# Patient Record
Sex: FEMALE | Race: WHITE | Employment: PART TIME | URBAN - METROPOLITAN AREA
[De-identification: names, ages, dates, MRNs, and addresses within clinical notes are randomized per-mention and may not be internally consistent; named-entity substitution may affect disease eponyms.]

---

## 2017-10-11 ENCOUNTER — HOSPITAL ENCOUNTER (EMERGENCY)
Age: 22
Discharge: HOME | End: 2017-10-11
Attending: FAMILY MEDICINE

## 2017-10-11 VITALS
WEIGHT: 135 LBS | SYSTOLIC BLOOD PRESSURE: 135 MMHG | OXYGEN SATURATION: 99 % | HEIGHT: 67 IN | BODY MASS INDEX: 21.19 KG/M2 | HEART RATE: 61 BPM | RESPIRATION RATE: 18 BRPM | DIASTOLIC BLOOD PRESSURE: 69 MMHG | TEMPERATURE: 98.1 F

## 2017-10-11 DIAGNOSIS — B35.4 TINEA CORPORIS: Primary | ICD-10-CM

## 2017-10-11 RX ORDER — CLOTRIMAZOLE AND BETAMETHASONE DIPROPIONATE 10; .64 MG/G; MG/G
CREAM TOPICAL
Qty: 30 G | Refills: 0 | Status: ON HOLD | OUTPATIENT
Start: 2017-10-11 | End: 2021-04-28

## 2017-10-11 NOTE — UC PROVIDER NOTE
Patient is a 20 y.o. female presenting with skin problem. The history is provided by the patient. Skin Problem    This is a new (single red patch onrt leg ) problem. The current episode started more than 1 week ago. The problem has not changed since onset. The problem is associated with an unknown factor. There has been no fever. The rash is present on the right lower leg. Associated symptoms include itching. Pertinent negatives include no pain. She has tried antibiotic Magdy Chimes ) for the symptoms. The treatment provided no relief. Past Medical History:   Diagnosis Date    Chicken pox         History reviewed. No pertinent surgical history. History reviewed. No pertinent family history. Social History     Social History    Marital status: SINGLE     Spouse name: N/A    Number of children: N/A    Years of education: N/A     Occupational History    Not on file. Social History Main Topics    Smoking status: Never Smoker    Smokeless tobacco: Never Used    Alcohol use Not on file    Drug use: Not on file    Sexual activity: Not on file     Other Topics Concern    Not on file     Social History Narrative                ALLERGIES: Review of patient's allergies indicates no known allergies. Review of Systems   Skin: Positive for itching. All other systems reviewed and are negative. Vitals:    10/11/17 1425   BP: 135/69   Pulse: 61   Resp: 18   Temp: 98.1 °F (36.7 °C)   SpO2: 99%   Weight: 61.2 kg (135 lb)   Height: 5' 7\" (1.702 m)       Physical Exam   Constitutional: No distress. Skin: Lesion (single erythematous lesion - quarter size with central clearing and well defined  peripheral margin and spreading ) noted. Nursing note and vitals reviewed.       MDM     Differential Diagnosis; Clinical Impression; Plan:     CLINICAL IMPRESSION:  Tinea corporis  (primary encounter diagnosis)      DDX    Plan:    Start Lotrisone cream bid  Follow up if not better  Amount and/or Complexity of Data Reviewed:    Review and summarize past medical records:  Yes  Risk of Significant Complications, Morbidity, and/or Mortality:   Presenting problems:  Low  Management options:  Low  Progress:   Patient progress:  Stable      Procedures

## 2017-10-11 NOTE — DISCHARGE INSTRUCTIONS
Ringworm: Care Instructions  Your Care Instructions  Ringworm is a fungus infection of the skin. It is not caused by a worm. Ringworm causes a round, scaly rash that may crack and itch. The rash can spread over a wide area. One type of fungus that causes ringworm is often found in locker rooms and swimming pools. It grows well in warm, moist areas of the skin, such as in skin folds. You can get ringworm by sharing towels, clothing, and sports equipment. You can also get it by touching someone who has ringworm. Ringworm is treated with cream that kills the fungus. If the rash is widespread, you may need pills to get rid of it. Ringworm often comes back after treatment. If the rash becomes infected with bacteria, you may need antibiotics. Follow-up care is a key part of your treatment and safety. Be sure to make and go to all appointments, and call your doctor if you are having problems. It's also a good idea to know your test results and keep a list of the medicines you take. How can you care for yourself at home? · Take your medicines exactly as prescribed. Call your doctor if you have any problems with your medicine. · Wash the rash with soap and water, remove flaky skin, and dry thoroughly. · Try an over-the-counter cream with clotrimazole or miconazole in it. Brand names include Lotrimin, Micatin, and Tinactin. Terbinafine cream (Lamisil) is also available without a prescription. Spread the cream beyond the edge or border of the rash. Follow the directions on the package. Do not stop using the medicine just because your skin clears up. You will probably need to continue treatment for 2 to 4 weeks. · To keep from getting another infection:  ¨ Do not go barefoot in public places such as gyms or locker rooms. Avoid sharing towels and clothes. Use flip-flops or some other type of shoe in the shower.   ¨ Do not wear tight clothes or let your skin stay damp for long periods, such as by staying in a wet bathing suit or sweaty clothes. When should you call for help? Call your doctor now or seek immediate medical care if:  · You have signs of infection such as:  ¨ Pain, warmth, or swelling in your skin. ¨ Red streaks near a wound in the skin. ¨ Pus coming from the rash on your skin. ¨ A fever. Watch closely for changes in your health, and be sure to contact your doctor if:  · Your ringworm does not improve after 2 weeks of treatment. · You do not get better as expected. Where can you learn more? Go to http://breonna-ale.info/. Enter Q579 in the search box to learn more about \"Ringworm: Care Instructions. \"  Current as of: October 13, 2016  Content Version: 11.3  © 6324-4784 [employer]. Care instructions adapted under license by [employer] (which disclaims liability or warranty for this information). If you have questions about a medical condition or this instruction, always ask your healthcare professional. [place] [address] any warranty or liability for your use of this information.

## 2021-04-27 ENCOUNTER — HOSPITAL ENCOUNTER (INPATIENT)
Age: 26
LOS: 5 days | Discharge: HOME | DRG: 751 | End: 2021-05-03
Attending: EMERGENCY MEDICINE | Admitting: PSYCHIATRY & NEUROLOGY
Payer: COMMERCIAL

## 2021-04-27 DIAGNOSIS — R45.851 SUICIDAL IDEATION: Primary | ICD-10-CM

## 2021-04-27 PROCEDURE — 90791 PSYCH DIAGNOSTIC EVALUATION: CPT

## 2021-04-27 PROCEDURE — 99284 EMERGENCY DEPT VISIT MOD MDM: CPT

## 2021-04-28 PROBLEM — F32.9 MDD (MAJOR DEPRESSIVE DISORDER): Status: ACTIVE | Noted: 2021-04-28

## 2021-04-28 LAB
ALBUMIN SERPL-MCNC: 4.3 G/DL (ref 3.5–5)
ALBUMIN/GLOB SERPL: 1.2 {RATIO} (ref 1.1–2.2)
ALP SERPL-CCNC: 48 U/L (ref 45–117)
ALT SERPL-CCNC: 18 U/L (ref 12–78)
AMPHET UR QL SCN: NEGATIVE
ANION GAP SERPL CALC-SCNC: 7 MMOL/L (ref 5–15)
APAP SERPL-MCNC: <2 UG/ML (ref 10–30)
APPEARANCE UR: CLEAR
AST SERPL-CCNC: 14 U/L (ref 15–37)
BARBITURATES UR QL SCN: NEGATIVE
BASOPHILS # BLD: 0.1 K/UL (ref 0–0.1)
BASOPHILS NFR BLD: 1 % (ref 0–1)
BENZODIAZ UR QL: NEGATIVE
BILIRUB SERPL-MCNC: 0.6 MG/DL (ref 0.2–1)
BILIRUB UR QL: NEGATIVE
BUN SERPL-MCNC: 10 MG/DL (ref 6–20)
BUN/CREAT SERPL: 16 (ref 12–20)
CALCIUM SERPL-MCNC: 9.9 MG/DL (ref 8.5–10.1)
CANNABINOIDS UR QL SCN: POSITIVE
CHLORIDE SERPL-SCNC: 105 MMOL/L (ref 97–108)
CO2 SERPL-SCNC: 26 MMOL/L (ref 21–32)
COCAINE UR QL SCN: NEGATIVE
COLOR UR: NORMAL
COMMENT, HOLDF: NORMAL
CREAT SERPL-MCNC: 0.64 MG/DL (ref 0.55–1.02)
DIFFERENTIAL METHOD BLD: NORMAL
DRUG SCRN COMMENT,DRGCM: ABNORMAL
EOSINOPHIL # BLD: 0.1 K/UL (ref 0–0.4)
EOSINOPHIL NFR BLD: 1 % (ref 0–7)
ERYTHROCYTE [DISTWIDTH] IN BLOOD BY AUTOMATED COUNT: 13 % (ref 11.5–14.5)
FLUAV RNA SPEC QL NAA+PROBE: NOT DETECTED
FLUBV RNA SPEC QL NAA+PROBE: NOT DETECTED
GLOBULIN SER CALC-MCNC: 3.6 G/DL (ref 2–4)
GLUCOSE SERPL-MCNC: 96 MG/DL (ref 65–100)
GLUCOSE UR STRIP.AUTO-MCNC: NEGATIVE MG/DL
HCG UR QL: NEGATIVE
HCT VFR BLD AUTO: 40.5 % (ref 35–47)
HGB BLD-MCNC: 13.6 G/DL (ref 11.5–16)
HGB UR QL STRIP: NEGATIVE
IMM GRANULOCYTES # BLD AUTO: 0 K/UL (ref 0–0.04)
IMM GRANULOCYTES NFR BLD AUTO: 0 % (ref 0–0.5)
KETONES UR QL STRIP.AUTO: NEGATIVE MG/DL
LEUKOCYTE ESTERASE UR QL STRIP.AUTO: NEGATIVE
LYMPHOCYTES # BLD: 2.6 K/UL (ref 0.8–3.5)
LYMPHOCYTES NFR BLD: 30 % (ref 12–49)
MCH RBC QN AUTO: 29.6 PG (ref 26–34)
MCHC RBC AUTO-ENTMCNC: 33.6 G/DL (ref 30–36.5)
MCV RBC AUTO: 88 FL (ref 80–99)
METHADONE UR QL: NEGATIVE
MONOCYTES # BLD: 0.6 K/UL (ref 0–1)
MONOCYTES NFR BLD: 7 % (ref 5–13)
NEUTS SEG # BLD: 5.4 K/UL (ref 1.8–8)
NEUTS SEG NFR BLD: 61 % (ref 32–75)
NITRITE UR QL STRIP.AUTO: NEGATIVE
NRBC # BLD: 0 K/UL (ref 0–0.01)
NRBC BLD-RTO: 0 PER 100 WBC
OPIATES UR QL: NEGATIVE
PCP UR QL: NEGATIVE
PH UR STRIP: 6.5 [PH] (ref 5–8)
PLATELET # BLD AUTO: 239 K/UL (ref 150–400)
PMV BLD AUTO: 10.1 FL (ref 8.9–12.9)
POTASSIUM SERPL-SCNC: 3.2 MMOL/L (ref 3.5–5.1)
PROT SERPL-MCNC: 7.9 G/DL (ref 6.4–8.2)
PROT UR STRIP-MCNC: NEGATIVE MG/DL
RBC # BLD AUTO: 4.6 M/UL (ref 3.8–5.2)
SALICYLATES SERPL-MCNC: <1.7 MG/DL (ref 2.8–20)
SAMPLES BEING HELD,HOLD: NORMAL
SARS-COV-2, COV2: NOT DETECTED
SODIUM SERPL-SCNC: 138 MMOL/L (ref 136–145)
SP GR UR REFRACTOMETRY: 1.01 (ref 1–1.03)
TSH SERPL DL<=0.05 MIU/L-ACNC: 6.11 UIU/ML (ref 0.36–3.74)
UROBILINOGEN UR QL STRIP.AUTO: 0.2 EU/DL (ref 0.2–1)
WBC # BLD AUTO: 8.8 K/UL (ref 3.6–11)

## 2021-04-28 PROCEDURE — 81025 URINE PREGNANCY TEST: CPT

## 2021-04-28 PROCEDURE — 87636 SARSCOV2 & INF A&B AMP PRB: CPT

## 2021-04-28 PROCEDURE — 84443 ASSAY THYROID STIM HORMONE: CPT

## 2021-04-28 PROCEDURE — 74011250637 HC RX REV CODE- 250/637: Performed by: NURSE PRACTITIONER

## 2021-04-28 PROCEDURE — 85025 COMPLETE CBC W/AUTO DIFF WBC: CPT

## 2021-04-28 PROCEDURE — 36415 COLL VENOUS BLD VENIPUNCTURE: CPT

## 2021-04-28 PROCEDURE — 80053 COMPREHEN METABOLIC PANEL: CPT

## 2021-04-28 PROCEDURE — 81003 URINALYSIS AUTO W/O SCOPE: CPT

## 2021-04-28 PROCEDURE — 65220000003 HC RM SEMIPRIVATE PSYCH

## 2021-04-28 PROCEDURE — 80179 DRUG ASSAY SALICYLATE: CPT

## 2021-04-28 PROCEDURE — 80307 DRUG TEST PRSMV CHEM ANLYZR: CPT

## 2021-04-28 PROCEDURE — 74011250637 HC RX REV CODE- 250/637: Performed by: PSYCHIATRY & NEUROLOGY

## 2021-04-28 PROCEDURE — 80143 DRUG ASSAY ACETAMINOPHEN: CPT

## 2021-04-28 RX ORDER — DIPHENHYDRAMINE HYDROCHLORIDE 50 MG/ML
50 INJECTION, SOLUTION INTRAMUSCULAR; INTRAVENOUS
Status: DISCONTINUED | OUTPATIENT
Start: 2021-04-28 | End: 2021-05-03 | Stop reason: HOSPADM

## 2021-04-28 RX ORDER — LORAZEPAM 2 MG/ML
1 INJECTION INTRAMUSCULAR
Status: DISCONTINUED | OUTPATIENT
Start: 2021-04-28 | End: 2021-05-03 | Stop reason: HOSPADM

## 2021-04-28 RX ORDER — OLANZAPINE 5 MG/1
5 TABLET ORAL
Status: DISCONTINUED | OUTPATIENT
Start: 2021-04-28 | End: 2021-05-03 | Stop reason: HOSPADM

## 2021-04-28 RX ORDER — HALOPERIDOL 5 MG/ML
5 INJECTION INTRAMUSCULAR
Status: DISCONTINUED | OUTPATIENT
Start: 2021-04-28 | End: 2021-05-03 | Stop reason: HOSPADM

## 2021-04-28 RX ORDER — TRAZODONE HYDROCHLORIDE 50 MG/1
50 TABLET ORAL
Status: DISCONTINUED | OUTPATIENT
Start: 2021-04-28 | End: 2021-04-29

## 2021-04-28 RX ORDER — HYDROXYZINE 50 MG/1
50 TABLET, FILM COATED ORAL
Status: DISCONTINUED | OUTPATIENT
Start: 2021-04-28 | End: 2021-05-03 | Stop reason: HOSPADM

## 2021-04-28 RX ORDER — TRAZODONE HYDROCHLORIDE 50 MG/1
50 TABLET ORAL
Status: DISCONTINUED | OUTPATIENT
Start: 2021-04-28 | End: 2021-04-28

## 2021-04-28 RX ORDER — ADHESIVE BANDAGE
30 BANDAGE TOPICAL DAILY PRN
Status: DISCONTINUED | OUTPATIENT
Start: 2021-04-28 | End: 2021-05-03 | Stop reason: HOSPADM

## 2021-04-28 RX ORDER — BENZTROPINE MESYLATE 1 MG/1
1 TABLET ORAL
Status: DISCONTINUED | OUTPATIENT
Start: 2021-04-28 | End: 2021-05-03 | Stop reason: HOSPADM

## 2021-04-28 RX ORDER — ACETAMINOPHEN 325 MG/1
650 TABLET ORAL
Status: DISCONTINUED | OUTPATIENT
Start: 2021-04-28 | End: 2021-05-03 | Stop reason: HOSPADM

## 2021-04-28 RX ORDER — ESCITALOPRAM OXALATE 10 MG/1
10 TABLET ORAL DAILY
Status: DISCONTINUED | OUTPATIENT
Start: 2021-04-28 | End: 2021-05-03 | Stop reason: HOSPADM

## 2021-04-28 RX ADMIN — ESCITALOPRAM OXALATE 10 MG: 10 TABLET ORAL at 10:55

## 2021-04-28 RX ADMIN — OLANZAPINE 5 MG: 5 TABLET, FILM COATED ORAL at 18:49

## 2021-04-28 RX ADMIN — TRAZODONE HYDROCHLORIDE 50 MG: 50 TABLET ORAL at 21:23

## 2021-04-28 RX ADMIN — HYDROXYZINE HYDROCHLORIDE 50 MG: 50 TABLET, FILM COATED ORAL at 10:47

## 2021-04-28 NOTE — PROGRESS NOTES
Problem: Discharge Planning  Goal: *Discharge to safe environment  Outcome: Progressing Towards Goal  Note: Pt has a safe home to return to and supportive family in and outside of the home. Goal: *Knowledge of medication management  Outcome: Progressing Towards Goal  Note: Pt agrees to take medication as prescribed  Goal: *Knowledge of discharge instructions  Outcome: Progressing Towards Goal  Note: Pt verbalized understanding of goals for tx and discharge.

## 2021-04-28 NOTE — BH NOTES
GROUP THERAPY PROGRESS NOTE     Patient did not participate in mindfulness group.      SABRINA Cabrera, Supervisee in Social Work

## 2021-04-28 NOTE — BH NOTES
PSYCHOSOCIAL ASSESSMENT  :Patient identifying info:   Nela Longo is a 31 y.o., female admitted 4/27/2021 10:47 PM     Presenting problem and precipitating factors: Patient was admitted to the ED for SI, per [facility] note, with plan to use gun or OD on pills. Attempt suicide via cutting wrist with razor ~10 days ago. No hx of attempted suicide. Precipitating factors include patient's partner left her for the child's biological father. The relationship was for 1.5 years. Experiencing an increase in depression, poor appetite and insomnia. THC daily. Mental status assessment: Alert and oriented. Mood is anxious and depressed. Tearful during interview. Speech is soft. Strengths: Patient has a supportive family and is seeking voluntary treatment. Collateral information: mother/Jimena Arce [phone] & Kiera Colon [phone] LATRICIA signed 4/28/21. Current psychiatric /substance abuse providers and contact info: none noted     Previous psychiatric/substance abuse providers and response to treatment: none noted     Family history of mental illness or substance abuse: paternal grandfather-depression     Substance abuse history:    Social History     Tobacco Use    Smoking status: Never Smoker    Smokeless tobacco: Current User   Substance Use Topics    Alcohol use: Not Currently       History of biomedical complications associated with substance abuse: none noted     Patient's current acceptance of treatment or motivation for change: voluntary admission     Family constellation: recently  from partner; pt is single and has no children. Is significant other involved? No       Describe support system: Itzel/friend; parents    Describe living arrangements and home environment: lives with mother, step-father and siblings.      Health issues:   Hospital Problems  Never Reviewed          Codes Class Noted POA    MDD (major depressive disorder) ICD-10-CM: F32.9  ICD-9-CM: 296.20  4/28/2021 Unknown              Trauma history: none noted     Legal issues: none noted     History of  service: none noted     Financial status: Employed-part time at [employer]     Yarsanism/cultural factors: none noted   none noted     Education/work history: high school     Have you been licensed as a health care professional (current or ): No     Leisure and recreation preferences: none noted     Describe coping skills: limited, ineffectual     Negra Davis  2021

## 2021-04-28 NOTE — ROUTINE PROCESS
TRANSFER - OUT REPORT:    Verbal report given to FILIBERTO Vann(name) on Jen Angel  being transferred to (unit) for routine progression of care       Report consisted of patients Situation, Background, Assessment and   Recommendations(SBAR). Information from the following report(s) SBAR, ED Summary, [facility] and Recent Results was reviewed with the receiving nurse. Lines:       Opportunity for questions and clarification was provided.       Patient transported with:   Registered Nurse   MARVIN

## 2021-04-28 NOTE — BH NOTES
PRN Medication Documentation    Specific patient behavior that led to need for PRN medication: anxiety, tearful  Staff interventions attempted prior to PRN being given: distraction  PRN medication given: 50 mg Atarax PO  Patient response/effectiveness of PRN medication:

## 2021-04-28 NOTE — ED PROVIDER NOTES
71-year-old female presents from home accompanied by her mother with complaints of depression and thoughts of hurting herself. She has had these feelings off and on for the past few years but they have worsened recently. She denies any specific plan at this point. Patient is very tearful and obviously upset. She denies any recent alcohol or drug abuse. No other complaints at this time. No other significant past medical history. Past Medical History:   Diagnosis Date    Chicken pox        History reviewed. No pertinent surgical history. History reviewed. No pertinent family history. Social History     Socioeconomic History    Marital status: SINGLE     Spouse name: Not on file    Number of children: Not on file    Years of education: Not on file    Highest education level: Not on file   Occupational History    Not on file   Social Needs    Financial resource strain: Not on file    Food insecurity     Worry: Not on file     Inability: Not on file    Transportation needs     Medical: Not on file     Non-medical: Not on file   Tobacco Use    Smoking status: Never Smoker    Smokeless tobacco: Never Used   Substance and Sexual Activity    Alcohol use: Not Currently    Drug use: Yes     Types: Marijuana     Comment: last used last night.      Sexual activity: Not on file   Lifestyle    Physical activity     Days per week: Not on file     Minutes per session: Not on file    Stress: Not on file   Relationships    Social connections     Talks on phone: Not on file     Gets together: Not on file     Attends Episcopal service: Not on file     Active member of club or organization: Not on file     Attends meetings of clubs or organizations: Not on file     Relationship status: Not on file    Intimate partner violence     Fear of current or ex partner: Not on file     Emotionally abused: Not on file     Physically abused: Not on file     Forced sexual activity: Not on file   Other Topics Concern    Not on file   Social History Narrative    Not on file         ALLERGIES: Patient has no known allergies. Review of Systems   Constitutional: Negative for fever. HENT: Negative for facial swelling. Eyes: Negative for visual disturbance. Respiratory: Negative for chest tightness. Cardiovascular: Negative for chest pain. Gastrointestinal: Negative for abdominal pain. Genitourinary: Negative for difficulty urinating and dysuria. Musculoskeletal: Negative for arthralgias. Skin: Negative for rash. Neurological: Negative for headaches. Hematological: Negative for adenopathy. Psychiatric/Behavioral: Positive for suicidal ideas. Vitals:    04/27/21 2238   BP: (!) 168/88   Pulse: 77   Resp: 16   Temp: 98.7 °F (37.1 °C)   SpO2: 98%            Physical Exam  Vitals signs and nursing note reviewed. Constitutional:       General: She is not in acute distress. Appearance: She is well-developed. HENT:      Head: Normocephalic and atraumatic. Eyes:      General: No scleral icterus. Conjunctiva/sclera: Conjunctivae normal.      Pupils: Pupils are equal, round, and reactive to light. Neck:      Musculoskeletal: Normal range of motion and neck supple. Cardiovascular:      Rate and Rhythm: Normal rate. Heart sounds: No murmur. Pulmonary:      Effort: Pulmonary effort is normal. No respiratory distress. Abdominal:      General: There is no distension. Musculoskeletal: Normal range of motion. Skin:     General: Skin is warm and dry. Findings: No rash. Neurological:      Mental Status: She is alert and oriented to person, place, and time. MDM  Number of Diagnoses or Management Options  Suicidal ideation  Diagnosis management comments: Assessment: Plan is to admit to psychiatric service pending medical clearance.          Procedures

## 2021-04-28 NOTE — ED NOTES
Pt given water and asked pt for urine sample. Pt states that she does not have to void at this time.

## 2021-04-28 NOTE — PROGRESS NOTES
[facility]  Master Treatment Plan for Jen Angel    Date Treatment Plan Initiated: 4/28/2021    Treatment Plan Modalities:  Type of Modality Amount  (x minutes) Frequency (x/week) Duration (x days) Name of Responsible Staff   Community & wrap-up meetings to encourage peer interactions 15 7 1 Maria     Group psychotherapy to assist in building coping skills and internal controls 60 7 1 Negra Davis   Therapeutic activity groups to build coping skills 60 7 1 Negra Davis   Psychoeducation in group setting to address:   Medication education   Tawana Piper 40. PharmD   Coping skills   30 3 1 Negra Davis   Relaxation techniques         Symptom management         Discharge planning   60 2 [facility]    60 2 1 Chaplain AGUAYO   61 1 1 volunteer   Recovery/AA/NA      volunteer   Physician medication management   15 7 1 MD Jorge/ASHTYN Ngo   Family meeting/discharge planning   15 2 1 Nayeli Brunner and [place] Miranda                                     Problem: Depressed Mood (Adult/Pediatric)  Goal: *STG: Participates in treatment plan  Outcome: Progressing Towards Goal  Goal: *STG: Verbalizes anger, guilt, and other feelings in a constructive manor  Outcome: Progressing Towards Goal  Goal: *STG: Attends activities and groups  Outcome: Progressing Towards Goal       Pt is visible on the unit. Cooperative, sad, anxious, tearful. Pt endorses thoughts of suicide, but states she has no plan to harm herself in the hospital.  Pt is receptive to medication education and is in agreement to starting antidepressant. Encouraged to utilize PRN medication available for anxiety. Staff focus is to encourage group participation and medication compliance and offer reassurance and support.

## 2021-04-28 NOTE — BH NOTES
Pt voluntarily admitted to general unit  +SI with vague plan to OD on pills or use gun, denies HI  Denies ETOH  UDS +THC - daily smoker  Current vape user  Pt tearful and sad due to relationship issues with ex girlfriend  Pt agrees to find staff if having thoughts of harming self. Skin assessment completed by Arabella Grossman, FILIBERTO and Carolyn Dubon RN. No wounds or impairments noted.         **Pt has access to guns at home--- states locked in gun safe and possibly one under mattress

## 2021-04-28 NOTE — PROGRESS NOTES
Patient educated on fall precautions while taking sedative medications. Patient resting in bed, greeted staff with sad affect. Will continue to educate and monitor. Problem: Falls - Risk of  Goal: *Absence of Falls  Description: Document [place] Fall Risk and appropriate interventions in the flowsheet.   Outcome: Progressing Towards Goal  Note: Fall Risk Interventions:            Medication Interventions: Teach patient to arise slowly, Evaluate medications/consider consulting pharmacy

## 2021-04-28 NOTE — BSMART NOTE
Comprehensive Assessment Form      Section I - Disposition    Axis I - Major Depressive Disorder, recurrent episode, severe   Axis II - deferred  Axis III -   Past Medical History:   Diagnosis Date    Chicken pox      The Medical Doctor to Psychiatrist conference was not completed. The Medical Doctor is in agreement with Psychiatrist disposition because of (reason) the patient meets criteria for admission. The plan is to present the patient for admission to  behavioral medicine once she is medically cleared. The on-call Psychiatrist consulted was Dr. Ernie Joya. The admitting Psychiatrist will be Dr. Jaye Nair. The admitting Diagnosis is Major Depressive Disorder. The Payor source is [address]. Section II - Integrated Summary  Summary:  The patient arrives ambulatory to the ED with her mother Ruth Cotton complaining of suicidal ideation with plan to use a gun or overdose on pills. She reports a previous attempt by cutting her wrist with a razor 10 days ago, but she did not tell anyone about this. The patient reports that she was in a relationship with a woman and they had a baby together, but the baby's father returned and her partner left the patient to be with him. The patient's depression has progressively worsened in the several weeks since. She reports thoughts of harming the baby's father but denies intent. The patient presents fully oriented with calm and cooperative behavior, depressed mood, congruent affect, linear and goal-directed thought process, appropriate thought content, soft speech, and intact memory. She reports having a poor appetite with unintentional weight loss, and she has insomnia which she treats with OTC sleep aids. The patient denies hallucinations and reports using nicotine vape and cannabis daily. She has no history of mental health treatment but is willing to try medication and therapy during hospitalization and upon discharge.     The patient is agreeable to voluntary admission to  behavioral medicine for acute stabilization and medication management. She would like her mother Ehsan Robb [phone] and her father Zari Harvey [phone] included in her treatment. The plan is to present the patient for admission once she is medically cleared. The patient has demonstrated mental capacity to provide informed consent. The information is given by the patient. The Chief Complaint is mental health problem. The Precipitant Factors are loss of significant other, substance use. Previous Hospitalizations: NONE  The patient has not previously been in restraints. Current Psychiatrist and/or  is NONE. Lethality Assessment:    The potential for suicide noted by the following: previous history of attempts which occured on 10 days ago in the form(s) of cutting the wrist, defined plan, ideation, means and current substance abuse . The potential for homicide is not noted. The patient has not been a perpetrator of sexual or physical abuse. There are not pending charges. The patient is felt to be at risk for self harm or harm to others. The attending nurse was advised of the plan. Section III - Psychosocial  The patient's overall mood and attitude is depressed/angry. Feelings of helplessness and hopelessness are observed by patient report and affect. Generalized anxiety is not observed. Panic is not observed. Phobias are not observed. Obsessive compulsive tendencies are not observed. Section IV - Mental Status Exam  The patient's appearance shows no evidence of impairment. The patient's behavior shows retardation. The patient is oriented to time, place, person and situation. The patient's speech is soft. The patient's mood is depressed and is angry. The range of affect shows no evidence of impairment. The patient's thought content demonstrates no evidence of impairment. The thought process shows no evidence of impairment.   The patient's perception shows no evidence of impairment. The patient's memory shows no evidence of impairment. The patient's appetite is decreased and shows signs of weight loss. The patient's sleep has evidence of insomnia. The patient shows little insight. The patient's judgement is psychologically impaired. Section V - Substance Abuse  The patient is using substances. The patient is using tobacco by inhalation (vape) for 5-10 years with last use on today and cannabis by inhalation for 5-10 years with last use on today. The patient has experienced the following withdrawal symptoms: N/A. Section VI - Living Arrangements  The patient is single (recently  from her partner). The patient lives with a parent. The patient has no children. The patient does plan to return home upon discharge. The patient does not have legal issues pending. The patient's source of income comes from employment. [facility] and cultural practices have not been voiced at this time. The patient's greatest support comes from her best friend Elaien Acosta and this person will not be involved with the treatment. The patient has not been in an event described as horrible or outside the realm of ordinary life experience either currently or in the past.  The patient has not been a victim of sexual/physical abuse. Section VII - Other Areas of Clinical Concern  The highest grade achieved is not assessed with the overall quality of school experience being described as not assessed. The patient is currently employed and speaks [place] as a primary language. The patient has no communication impairments affecting communication. The patient's preference for learning can be described as: learns best by written information and learns best by oral information. The patient's hearing is normal.  The patient's vision is impaired and  wears glasses or contacts.       Sonido Rizvi, [facility], Resident in Counseling #[phone]

## 2021-04-28 NOTE — BH NOTES
GROUP THERAPY PROGRESS NOTE     Patient participated in progressive process group. Group time: 50 min     Personal goal for participation: Practice positive affirmation, journaling and letter writing techniques to process thoughts and emotions related to ones sense of self and begin to improve self-talk and self-image in efforts to improve mental and emotional wellbeing. Goal orientation: Personal.      Group therapy participation:  passive      Therapeutic interventions reviewed and discussed: Group members were given the opportunity to listen to guided morning mediation with positive affirmation, choose from a list of 101 affirmations and share why they chose that affirmation for themselves, the goals they have, and the action steps needed to achieve their goals. Members were asked to write letters to themselves either forgiving a mistake from their past or congratulating themselves on a future accomplishment. Members then read their letters aloud and processed the experience with each other. Impression of participation: Savanah Oliveira was alert, oriented, depressed, anxious and guarded. She was tearful during group, stayed for the duration but did not engage group conversation.     SABRINA Duffy, Supervisee in Social Work
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
## 2021-04-28 NOTE — BH NOTES
Behavioral Health Interdisciplinary Rounds     Patient Name: Daya Person  Age: 31 y.o. Room/Bed:  727/  Primary Diagnosis: <principal problem not specified>   Admission Status: Voluntary     Readmission within 30 days:   Power of  in place:   Patient requires a blocked bed:           Reason for blocked bed:     VTE Prophylaxis:     Mobility needs/Fall risk:   Flu Vaccine :    Nutritional Plan:   Consults:          Labs/Testing due today?:     Sleep hours:        Participation in Care/Groups:    Medication Compliant?:   PRNS (last 24 hours):     Restraints (last 24 hours):       CIWA (range last 24 hours):     COWS (range last 24 hours):      Alcohol screening (AUDIT) completed -   AUDIT Score: 0     If applicable, date SBIRT discussed in treatment team AND documented:   AUDIT Screen Score: AUDIT Score: 0      Document Brief Intervention (corresponds directly with the 5 A's, Ask, Advise, Assess, Assist, and Arrange): At- Risk Patients (Score 7-15 for women; 8-15 for men)  Discuss concern patient is drinking at unhealthy levels known to increase risk of alcohol-related health problems. Is Patient ready to commit to change? If No:   Encourage reflection   Discuss short term and long term health risks of consuming alcohol   Barriers to change   Reaffirm willingness to help / Educational materials provided  If Yes:   Set goal  Sendoid provided    Harmful use or Dependence (Score 16 or greater)   Discuss short term and long term health risks of consuming alcohol   Recommendations   Negotiate drinking goal   Recommend addiction specialist/center   Arrange follow-up appointments.     Tobacco - patient is a smoker: Have You Used Tobacco in the Past 30 Days: No(pt denies and declines medications)  Illegal Drugs use: Have You Used Any Illegal Substances Over the Past 12 Months: Yes    24 hour chart check complete:      Patient goal(s) for today: meet treatment team, acclimate to unit  Treatment team focus/goals: assess needs for treatment and safe discharge; patient will be started on Lexapro  Progress note:  Alert and oriented. Mood is anxious and depressed. Tearful during interview. Speech is soft. LOS:  0  Expected LOS: TBD    Financial concerns/prescription coverage:    Family contact:  mother/Jimena Arce [phone] & Joanie Wright [phone]  LATRICIA 4/28/21    Family requesting physician contact today: no   Discharge plan: to return home   Access to weapons: yes; Mother confirmed that patient's guns are secured in the basement of the home and locked on 4/28/21. Outpatient provider(s): TBD  Patient's preferred phone number for follow up call: none noted    Patient's preferred e-mail address: none noted     Participating treatment team members: SABRINA Chang; Dr Zacarias Cote; Yobany Mcgrath, RN; Felipe Le, PharmD.

## 2021-04-28 NOTE — ED TRIAGE NOTES
Patient arrives ambulatory from home with CC suicidal ideation. States she has had these feelings on and off for years. Denies plan. Patient tearful and withdrawn in triage.

## 2021-04-28 NOTE — PROGRESS NOTES
Admission Medication Reconciliation:    Information obtained from:  patient interview and [facility]  RxQuery data available¹:  NO    Comments/Recommendations: Updated PTA meds/reviewed patient's allergies. 1)  Patient denies taking any prescription medications prior to admission. She has been using Tylenol PM for sleep for the last two weeks. 2)  The [place] Prescription Monitoring Program () was assessed to determine fill history of any controlled medications. The patient has NOT filled any controlled medications in the last  2 years. 3)  Medication changes (since last review): Added  - Tylenol PM QHS     Removed  - clotrimazole/betamethasone cream   ¹RxQuery pharmacy benefit data reflects medications filled and processed through the patient's insurance, however this data does NOT capture whether the medication was picked up or is currently being taken by the patient. Allergies:  Patient has no known allergies. Significant PMH/Disease States:   Past Medical History:   Diagnosis Date    Chicken pox      Chief Complaint for this Admission:    Chief Complaint   Patient presents with    Suicidal     Prior to Admission Medications:   Prior to Admission Medications   Prescriptions Last Dose Informant Taking?   acetaminophen 500 mg tab 500 mg, diphenhydrAMINE 25 mg cap 25 mg 4/26/2021  Yes   Sig: Take 1 Dose by mouth nightly.       Facility-Administered Medications: None     Angelika Grande, ANJUD

## 2021-04-28 NOTE — H&P
[address] [facility] HISTORY AND PHYSICAL    Name:  Zandra Ferro  MR#:  [id number]  :  1995  ACCOUNT #:  [de-identified]  ADMIT DATE:  2021      INITIAL PSYCHIATRIC INTERVIEW    CHIEF COMPLAINT:  \"I think I have been depressed all my life. \"    HISTORY OF PRESENT ILLNESS:  The patient is a 79-year-old  female who is currently admitted after she was brought by her mother to the ER with a report of worsening depression. She reports that she broke up with her fiance of 1-1/2 years recently and has felt worse ever since then. States that they have been together for a year and a half until she got pregnant and has now decided to stay with the father of her child and maintain that relationship. States that she has felt betrayed and is depressed because of this. Notes that she is sleeping poorly, has little energy or motivation, cries multiple times in a day, has started losing weight although she could not estimate how much weight she has lost.  According to the family, she has lost at least 5 pounds in the past month. States that she thinks about suicide by driving her car into oncoming traffic or by taking an overdose or hanging herself. Denies any psychotic symptoms at the present time. She denies use of alcohol but says that she has been using marijuana since the age of 24 years. States that she vapes a small amount of it almost every day and does not consider this to be an issue. Urine drug screen was positive for marijuana only. Since her arrival on the unit, she has been calm and cooperative and has not been a behavioral disturbance except for crying very often and she cried several times during the interview also. PAST MEDICAL HISTORY:  Reviewed as per the history and physical exam.      Past Medical History:   Diagnosis Date    Chicken pox      Prior to Admission medications    Medication Sig Start Date End Date Taking?  Authorizing Provider   acetaminophen 500 mg tab 500 mg, diphenhydrAMINE 25 mg cap 25 mg Take 1 Dose by mouth nightly. Yes Provider, Historical     Vitals:    04/28/21 2009 04/29/21 0908 04/29/21 1003 04/29/21 1132   BP: 116/78 120/84  125/80   Pulse: 65 90  65   Resp: 14 16  16   Temp: 98.3 °F (36.8 °C) 98.5 °F (36.9 °C)  98.2 °F (36.8 °C)   SpO2:  96%  98%   Weight:   56.7 kg (125 lb)    Height:   5' 7\" (1.702 m)      Lab Results   Component Value Date/Time    WBC 8.8 04/28/2021 12:01 AM    HGB 13.6 04/28/2021 12:01 AM    HCT 40.5 04/28/2021 12:01 AM    PLATELET 319 37/26/0747 12:01 AM    MCV 88.0 04/28/2021 12:01 AM     Lab Results   Component Value Date/Time    Sodium 138 04/28/2021 12:01 AM    Potassium 3.2 (L) 04/28/2021 12:01 AM    Chloride 105 04/28/2021 12:01 AM    CO2 26 04/28/2021 12:01 AM    Anion gap 7 04/28/2021 12:01 AM    Glucose 96 04/28/2021 12:01 AM    BUN 10 04/28/2021 12:01 AM    Creatinine 0.64 04/28/2021 12:01 AM    BUN/Creatinine ratio 16 04/28/2021 12:01 AM    GFR est AA >60 04/28/2021 12:01 AM    GFR est non-AA >60 04/28/2021 12:01 AM    Calcium 9.9 04/28/2021 12:01 AM    Bilirubin, total 0.6 04/28/2021 12:01 AM    Alk. phosphatase 48 04/28/2021 12:01 AM    Protein, total 7.9 04/28/2021 12:01 AM    Albumin 4.3 04/28/2021 12:01 AM    Globulin 3.6 04/28/2021 12:01 AM    A-G Ratio 1.2 04/28/2021 12:01 AM    ALT (SGPT) 18 04/28/2021 12:01 AM    AST (SGOT) 14 (L) 04/28/2021 12:01 AM     No results found for: VALF2, VALAC, VALP, VALPR, DS6, CRBAM, CRBAMP, CARB2, XCRBAM  No results found for: LITHM  RADIOLOGY REPORTS:(reviewed/updated 4/29/2021)  No results found. Lab Results   Component Value Date/Time    Pregnancy test,urine (POC) Negative 04/28/2021 12:37 AM         PAST PSYCHIATRIC HISTORY:  The patient denies any prior history of inpatient hospitalization, but has a history of cutting her left forearm 10 days ago with a razor. Denies any prior history of other incidents of self-harm.   Denies prior history of substance abuse except as mentioned in the history of present illness. States that she has had at least three prior episodes of depression some of which have lasted for several months but never sought treatment for these episodes. PSYCHOSOCIAL HISTORY:  The patient currently lives in [place] where she lives with her mother and her stepfather. Four younger siblings also live in the same house. 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 Her family remains quite supportive, and she denies any major legal or financial stressors. Her recent troubles with her romantic relationship were described above. Denies any major legal stressors. MENTAL STATUS EXAM:  The patient is a young  female who is dressed in hospital apparel. She cried several times during the interview and looks depressed. Makes limited eye contact. Her psychomotor activity is decreased. Speech is spontaneous and coherent but speaks very softly and can be difficult to understand. Denies any delusions. Denies any perceptual abnormalities. Passive suicidal ideation is present but feels safe in the hospital.  Thought process is logical and goal-directed. Cognitively, she is awake and alert, oriented to time, place, and person. Intelligence is average, memory is intact, and fund of knowledge is adequate. Insight is partial.  Judgment is poor. ASSESSMENT AND PLAN/DIAGNOSES:  Recurrent major depression, severe without psychotic symptoms; marijuana use disorder, moderate. Rule out borderline personality disorder. At the present time, I will continue her inpatient stay. She will be provided with support and attend groups. Psychotropic medications will be adjusted as needed. Her strengths include her ability to seek help and support from her family.       Marialuisa Mooney MD      [id number]  D:  04/28/2021 12:23  T:  04/28/2021 16:18  JOB #:  [id number]

## 2021-04-29 PROCEDURE — 74011250637 HC RX REV CODE- 250/637: Performed by: NURSE PRACTITIONER

## 2021-04-29 PROCEDURE — 74011250637 HC RX REV CODE- 250/637: Performed by: PSYCHIATRY & NEUROLOGY

## 2021-04-29 PROCEDURE — 65220000003 HC RM SEMIPRIVATE PSYCH

## 2021-04-29 RX ORDER — TRAZODONE HYDROCHLORIDE 100 MG/1
100 TABLET ORAL
Status: DISCONTINUED | OUTPATIENT
Start: 2021-04-29 | End: 2021-04-30

## 2021-04-29 RX ADMIN — ACETAMINOPHEN 650 MG: 325 TABLET, FILM COATED ORAL at 09:58

## 2021-04-29 RX ADMIN — TRAZODONE HYDROCHLORIDE 100 MG: 100 TABLET ORAL at 21:17

## 2021-04-29 RX ADMIN — ESCITALOPRAM OXALATE 10 MG: 10 TABLET ORAL at 09:57

## 2021-04-29 NOTE — BH NOTES
Chief Complaint:  I feel really tired    Length of Stay: 1 Days    Interval History:  Sheldon Chapman is little changed. Says she continues to have SI but the frequency is slightly reduced. Slept poorly last night and woke up feeling tired. Says she has been crying although less than yesterday. Attended some groups but is not actively participating. At the present time the patient Abhishek Sargent remains compliant with taking medications. Denies any adverse events from taking them and feels they have been beneficial.         Past Medical History:  Past Medical History:   Diagnosis Date    Chicken pox            Labs:  Lab Results   Component Value Date/Time    WBC 8.8 04/28/2021 12:01 AM    HGB 13.6 04/28/2021 12:01 AM    HCT 40.5 04/28/2021 12:01 AM    PLATELET 715 78/17/4087 12:01 AM    MCV 88.0 04/28/2021 12:01 AM      Lab Results   Component Value Date/Time    Sodium 138 04/28/2021 12:01 AM    Potassium 3.2 (L) 04/28/2021 12:01 AM    Chloride 105 04/28/2021 12:01 AM    CO2 26 04/28/2021 12:01 AM    Anion gap 7 04/28/2021 12:01 AM    Glucose 96 04/28/2021 12:01 AM    BUN 10 04/28/2021 12:01 AM    Creatinine 0.64 04/28/2021 12:01 AM    BUN/Creatinine ratio 16 04/28/2021 12:01 AM    GFR est AA >60 04/28/2021 12:01 AM    GFR est non-AA >60 04/28/2021 12:01 AM    Calcium 9.9 04/28/2021 12:01 AM    Bilirubin, total 0.6 04/28/2021 12:01 AM    Alk.  phosphatase 48 04/28/2021 12:01 AM    Protein, total 7.9 04/28/2021 12:01 AM    Albumin 4.3 04/28/2021 12:01 AM    Globulin 3.6 04/28/2021 12:01 AM    A-G Ratio 1.2 04/28/2021 12:01 AM    ALT (SGPT) 18 04/28/2021 12:01 AM      Vitals:    04/28/21 2009 04/29/21 0908 04/29/21 1003 04/29/21 1132   BP: 116/78 120/84  125/80   Pulse: 65 90  65   Resp: 14 16  16   Temp: 98.3 °F (36.8 °C) 98.5 °F (36.9 °C)  98.2 °F (36.8 °C)   SpO2:  96%  98%   Weight:   56.7 kg (125 lb)    Height:   5' 7\" (1.702 m)          Current Facility-Administered Medications   Medication Dose Route Frequency Provider Last Rate Last Admin    OLANZapine (ZyPREXA) tablet 5 mg  5 mg Oral Q6H PRN Glendy Agarwal, NP   5 mg at 04/28/21 1849    haloperidol lactate (HALDOL) injection 5 mg  5 mg IntraMUSCular Q6H PRN Turner-Nitesh, Ozzie Rather, NP        benztropine (COGENTIN) tablet 1 mg  1 mg Oral BID PRN Turner-Nitesh, Ozzie Rather, NP        diphenhydrAMINE (BENADRYL) injection 50 mg  50 mg IntraMUSCular BID PRN Turner-Nitesh, Ozzie Rather, NP        hydrOXYzine HCL (ATARAX) tablet 50 mg  50 mg Oral TID PRN Glendy Agarwal, NP   50 mg at 04/28/21 1047    LORazepam (ATIVAN) injection 1 mg  1 mg IntraMUSCular Q4H PRN Turner-Nitesh, Ozzie Rather, NP        acetaminophen (TYLENOL) tablet 650 mg  650 mg Oral Q4H PRN Glendy Agarwal, NP   650 mg at 04/29/21 0958    magnesium hydroxide (MILK OF MAGNESIA) 400 mg/5 mL oral suspension 30 mL  30 mL Oral DAILY PRN Glendy Agarwal, NP        escitalopram oxalate (LEXAPRO) tablet 10 mg  10 mg Oral DAILY Gabriel Arana MD   10 mg at 04/29/21 0957    traZODone (DESYREL) tablet 50 mg  50 mg Oral QHS Gabriel Arana MD   50 mg at 04/28/21 2123         Mental Status Exam:  Eye contact: limited  Grooming: fair  Psychomotor activity: Decreased  Speech is spontaneous  Mood is \"low\"  Affect: depressed. Perception: Denies any AH or VH  Suicidal ideation:  passive SI +  Cognition is grossly intact       Physical Exam:  Body habitus: Body mass index is 19.58 kg/m². Musculoskeletal system: normal gait  Tremor - neg  Cog wheeling - neg      Assessment and Plan:  Gena Sandoval meets criteria for a diagnosis of Recurrent major depression, severe without psychotic symptoms; marijuana use disorder, moderate. Rule out borderline personality disorder. Increased Trazodone to 100mg at bedtime. Continue the medication regimen as prescribed  Disposition planning to continue.    A coordinated, multidisplinary treatment team round was conducted with the patient, nurses, pharmcist,  and writer present. Discussions held with , and/or with family members; Complete current electronic health record for patient was reviewed in full including consultant notes, ancillary staff notes, nurses and tech notes, labs and vitals. I certify that this patients inpatient psychiatric hospital services furnished since the previous certification were, and continue to be, required for treatment that could reasonably be expected to improve the patient's condition, or for diagnostic study, and that the patient continues to need, on a daily basis, active treatment furnished directly by or requiring the supervision of inpatient psychiatric facility personnel. In addition, the hospital records show that services furnished were intensive treatment services, admission or related services, or equivalent services.

## 2021-04-29 NOTE — PROGRESS NOTES
Patient educated on fall precautions while taking sedative medications. Patient needs encouraging to be present on milieu. Will continue to encourage, educate, and monitor. Problem: Depressed Mood (Adult/Pediatric)  Goal: *STG: Attends activities and groups  Outcome: Progressing Towards Goal     Problem: Falls - Risk of  Goal: *Absence of Falls  Description: Document Clive Carr Fall Risk and appropriate interventions in the flowsheet.   Outcome: Progressing Towards Goal  Note: Fall Risk Interventions:            Medication Interventions: Teach patient to arise slowly, Evaluate medications/consider consulting pharmacy

## 2021-04-29 NOTE — INTERDISCIPLINARY ROUNDS
Behavioral Health Interdisciplinary Rounds     Patient Name: Raven Greene  Age: 31 y.o. Room/Bed:  727/01  Primary Diagnosis: <principal problem not specified>   Admission Status: Voluntary     Readmission within 30 days: no  Power of  in place: no  Patient requires a blocked bed: no          Reason for blocked bed:     VTE Prophylaxis: No    Mobility needs/Fall risk: no  Flu Vaccine : no   Nutritional Plan: no  Consults:          Labs/Testing due today?: no    Sleep hours: 8       Participation in Care/Groups:  yes  Medication Compliant?: Yes  PRNS (last 24 hours): Antipsychotic (PO) and Antianxiety    Restraints (last 24 hours):  no     CIWA (range last 24 hours):     COWS (range last 24 hours):      Alcohol screening (AUDIT) completed -   AUDIT Score: 0     If applicable, date SBIRT discussed in treatment team AND documented:   AUDIT Screen Score: AUDIT Score: 0        Tobacco - patient is a smoker: Have You Used Tobacco in the Past 30 Days: Yes(nicotine vape daily pt denies however states in ED w [facility])  Illegal Drugs use: Have You Used Any Illegal Substances Over the Past 12 Months: Yes    24 hour chart check complete: yes     Patient goal(s) for today: attend groups, take medications  Treatment team focus/goals: titrate medication, increase trazodone, coordinate follow up. Progress note: Pt voluntarily admitted for SI with plan to OD or use gun. Pt is alert, oriented, quiet, with blunted affect and reports feeling tired. She is soft spoken and difficult to hear. She reports feeling anxious last night and required PRN. She does not feel anxious now but endorses SI is only slightly reduced. She is tolerating lexapro, but has a slight headache. She is speaking to her family by phone and feels supported. Pt will return home to live with family and be connected with her local [facility] due to lack of insurance coverage.      LOS:  1  Expected LOS: TBD     Financial concerns/prescription coverage: UNINSURED  Family contact:  mother/Jimena Arce [phone] & father/Luis Wells [phone]    LATRICIA 4/28/21                         Family requesting physician contact today: no   Discharge plan: to return home   Access to weapons: yes; Mother confirmed that patient's guns are secured in the basement of the home and locked on 4/28/21. Outpatient provider(s): TBD - link with B  Patient's preferred phone number for follow up call: [phone]   Patient's preferred e-mail address:      Participating treatment team members: SABRINA Collier; Dr Jessica Caicedo; Ladoris Phoenix, FILIBERTO; Juan Hernandez, PharmD.

## 2021-04-29 NOTE — PROGRESS NOTES
Problem: Depressed Mood (Adult/Pediatric)  Goal: *STG: Participates in treatment plan  Outcome: Progressing Towards Goal  Note: Out on unit for group and activities, quietly present. Noted in bed quietly tearful denying SI. Reports fleeting SI decreased, describes depressed mood. Demonstrates appropriate behaviors, ability to interact w peers and follow unit routine.  Staff focus is on offering support and reasssurance  Goal: *STG: Verbalizes anger, guilt, and other feelings in a constructive manor  Outcome: Progressing Towards Goal  Goal: *STG: Attends activities and groups  Outcome: Progressing Towards Goal  Goal: *STG: Demonstrates reduction in symptoms and increase in insight into coping skills/future focused  Outcome: Progressing Towards Goal  Goal: *STG: Complies with medication therapy  Outcome: Progressing Towards Goal  Goal: Interventions  Outcome: Progressing Towards Goal

## 2021-04-29 NOTE — BH NOTES
GROUP THERAPY PROGRESS NOTE    Patient  is participating in Discharge Planning Group    Group time: 45 minutes    Personal goal for participation: Preparation for Discharge     Goal orientation: Making Decisions with Your Support Team    Group therapy participation: Active     Therapeutic interventions reviewed and discussed: Yes     Impression of participation: Eduar Sauer was much more active in group. She asked significant questions regarding her support system which she described as adequate. She voiced discussing issues with her supporters  can be difficult.

## 2021-04-29 NOTE — BH NOTES
GROUP THERAPY PROGRESS NOTE    Patient  is participating in [employer] 37 time: 45 minutes    Personal goal for participation: Preparation for Today     Goal orientation: Eval Your Wellness and Coping In [employer]    Group therapy participation: minimal    Therapeutic interventions reviewed and discussed:     Impression of participation: Eduar Sauer was present for time allotted for group. She was  very quiet, soft spoken and not active. She shared feeling depressed  and feeling sad. (#2) Her one goal is to feel better but unsure what she can do. US encouraged her to share her feelings with [address] [place] [address] team for direction and support.

## 2021-04-29 NOTE — PROGRESS NOTES
Problem: Discharge Planning  Goal: *Discharge to safe environment  Outcome: Progressing Towards Goal  Note: Patient identifies home as a safe environment and plans to return upon discharge. Patient has supportive family. Patient will be connected to local B for outpatient treatment. Goal: *Knowledge of medication management  Outcome: Progressing Towards Goal  Note: Patient agrees to take medications as prescribed. Goal: *Knowledge of discharge instructions  Outcome: Progressing Towards Goal  Note: Patient verbalizes understanding of goals for treatment and safe discharge.

## 2021-04-29 NOTE — PROGRESS NOTES
Problem: Falls - Risk of  Goal: *Absence of Falls  Description: Document Mariaelena Kaplan Fall Risk and appropriate interventions in the flowsheet.   Outcome: Progressing Towards Goal  Note: Fall Risk Interventions:            Medication Interventions: Teach patient to arise slowly          Pt appears asleep in bed, NAD, even respirations, will continue to monitor q15min

## 2021-04-29 NOTE — PROGRESS NOTES
[facility] participated in spirituality group on 4/29/2021. Rev.  Melissa Garber MDiv, [facility], [facility]   paging service: [phone])

## 2021-04-30 PROCEDURE — 74011250637 HC RX REV CODE- 250/637: Performed by: NURSE PRACTITIONER

## 2021-04-30 PROCEDURE — 65220000003 HC RM SEMIPRIVATE PSYCH

## 2021-04-30 PROCEDURE — 74011250637 HC RX REV CODE- 250/637: Performed by: PSYCHIATRY & NEUROLOGY

## 2021-04-30 RX ADMIN — ESCITALOPRAM OXALATE 10 MG: 10 TABLET ORAL at 08:54

## 2021-04-30 RX ADMIN — TRAZODONE HYDROCHLORIDE 150 MG: 100 TABLET ORAL at 21:20

## 2021-04-30 RX ADMIN — HYDROXYZINE HYDROCHLORIDE 50 MG: 50 TABLET, FILM COATED ORAL at 00:11

## 2021-04-30 NOTE — BH NOTES
GROUP THERAPY PROGRESS NOTE    Patient  is participating in [facility] [employer] 37 time: 45 minutes    Personal goal for participation: Preparation for Today     Goal orientation: Eval Your Wellness and Coping In [employer]    Group therapy participation: Active     Therapeutic interventions reviewed and discussed:     Impression of participation: Mimi Rubio was present for allotted for group. Pt today was much more animated,smiling and more engaging  than on yesterday. Mimi Rubio stated her mood was much brighter and ability to engage others is also improved. She is coping better in the community and socializing with select peers.  Pt.'s goal today is  begin pre-discharge planning with her [address] team.

## 2021-04-30 NOTE — BH NOTES
GROUP THERAPY PROGRESS NOTE    Patient  is participating in Discharge Planning Group    Group time: I Hr     Personal goal for participation: Tools for Recovery     Goal orientation: Balancing Your Wellness Wheel    Group therapy participation: Active    Therapeutic interventions reviewed and discussed:     Impression of participation: Enid Leyva was very active and she participated in group without prompts from group facilitator. Pt said she is focused  on maintaining  her wellness by managing her mental health symptoms.

## 2021-04-30 NOTE — INTERDISCIPLINARY ROUNDS
Behavioral Health Interdisciplinary Rounds     Patient Name: Gena Sandoval  Age: 31 y.o. Room/Bed:  727/  Primary Diagnosis: <principal problem not specified>   Admission Status: Voluntary     Readmission within 30 days: no  Power of  in place: no  Patient requires a blocked bed: no          Reason for blocked bed:     VTE Prophylaxis: No    Mobility needs/Fall risk: no  Flu Vaccine : no   Nutritional Plan: no  Consults:          Labs/Testing due today?: no    Sleep hours:  7       Participation in Care/Groups:  yes  Medication Compliant?: Yes  PRNS (last 24 hours): Antianxiety and Pain    Restraints (last 24 hours):  no     CIWA (range last 24 hours):     COWS (range last 24 hours):      Alcohol screening (AUDIT) completed -   AUDIT Score: 0     If applicable, date SBIRT discussed in treatment team AND documented:   AUDIT Screen Score: AUDIT Score: 0      Tobacco - patient is a smoker: Have You Used Tobacco in the Past 30 Days: Yes(nicotine vape daily pt denies however states in ED w [facility])  Illegal Drugs use: Have You Used Any Illegal Substances Over the Past 12 Months: Yes    24 hour chart check complete: yes     Patient goal(s) for today:attend groups, take medications, call [facility] to complete telephone intake  Treatment team focus/goals: titrate medication, increase trazodone, coordinate follow up. Progress note: Pt voluntarily admitted for SI with plan to OD or use gun; mother confirmed guns have been secured. Pt is alert, oriented and has a brighter affect. She reported feeling a little better and is smiling more. She reported difficulty sleeping. She was educated on negative effects of THC use and encourage to be sober from it upon discharge. Pt will return home to live with family and be connected with her local [facility] due to lack of insurance coverage. She was asked to call [facility] and complete intake via phone.     LOS:  2  Expected LOS: TBD     Financial concerns/prescription coverage: [employer] contact:  mother/Jimena Arce [phone] & father/Luis Jean Baptiste [phone]    ALEX 4/28/21                         Family requesting physician contact today: no   Discharge plan: to return home   Access to weapons: yes; Mother confirmed that patient's guns are secured in the basement of the home and locked on 4/28/21.                                               Outpatient provider(s): link with [facility]  Patient's preferred phone number for follow up call: [phone]   Patient's preferred e-mail address:      Participating treatment team members: Tonie Wells, Mali ForrestMSW; Dr Ronald Rivero, RN;

## 2021-04-30 NOTE — PROGRESS NOTES
Problem: Falls - Risk of  Goal: *Absence of Falls  Description: Document Negin Villalobos Fall Risk and appropriate interventions in the flowsheet.   Outcome: Progressing Towards Goal  Note: Fall Risk Interventions:            Medication Interventions: Teach patient to arise slowly, Evaluate medications/consider consulting pharmacy       Pt appears asleep in bed, NAD, even respirations, will continue to monitor q15min             PRN Medication Documentation    Specific patient behavior that led to need for PRN medication: c/o anxiety/restelssness   Staff interventions attempted prior to PRN being given: reduce stimuli  PRN medication given: Atarax 50mg PO  Patient response/effectiveness of PRN medication: will continue to assess

## 2021-04-30 NOTE — BH NOTES
Chief Complaint:  I feel a little better. Length of Stay: 2 Days    Interval History:  Jose Martin Sellers is slightly better today. Says she slept only 5 hours last night and woke up several times. However her mood is better today and feels more \"hopeful\". She has been visible in the milieu and social with peers. Discussed effects of Marijuana on depression and anxiety and she verbalized understanding. Pleasant and calm during the interview. Frequency of SI has decreased slightly. At the present time the patient Tiara Mckeon remains compliant with taking medications. Denies any adverse events from taking them and feels they have been beneficial.       Past Medical History:  Past Medical History:   Diagnosis Date    Chicken pox            Labs:  Lab Results   Component Value Date/Time    WBC 8.8 04/28/2021 12:01 AM    HGB 13.6 04/28/2021 12:01 AM    HCT 40.5 04/28/2021 12:01 AM    PLATELET 502 09/74/8482 12:01 AM    MCV 88.0 04/28/2021 12:01 AM      Lab Results   Component Value Date/Time    Sodium 138 04/28/2021 12:01 AM    Potassium 3.2 (L) 04/28/2021 12:01 AM    Chloride 105 04/28/2021 12:01 AM    CO2 26 04/28/2021 12:01 AM    Anion gap 7 04/28/2021 12:01 AM    Glucose 96 04/28/2021 12:01 AM    BUN 10 04/28/2021 12:01 AM    Creatinine 0.64 04/28/2021 12:01 AM    BUN/Creatinine ratio 16 04/28/2021 12:01 AM    GFR est AA >60 04/28/2021 12:01 AM    GFR est non-AA >60 04/28/2021 12:01 AM    Calcium 9.9 04/28/2021 12:01 AM    Bilirubin, total 0.6 04/28/2021 12:01 AM    Alk.  phosphatase 48 04/28/2021 12:01 AM    Protein, total 7.9 04/28/2021 12:01 AM    Albumin 4.3 04/28/2021 12:01 AM    Globulin 3.6 04/28/2021 12:01 AM    A-G Ratio 1.2 04/28/2021 12:01 AM    ALT (SGPT) 18 04/28/2021 12:01 AM      Vitals:    04/29/21 1132 04/29/21 1519 04/29/21 2024 04/30/21 0744   BP: 125/80 119/80 113/74 118/76   Pulse: 65 69 91 75   Resp: 16 16 14 18   Temp: 98.2 °F (36.8 °C) 98.4 °F (36.9 °C) 98.4 °F (36.9 °C) 98.3 °F (36.8 °C)   SpO2: 98% 95% 95% 97%   Weight:       Height:             Current Facility-Administered Medications   Medication Dose Route Frequency Provider Last Rate Last Admin    traZODone (DESYREL) tablet 100 mg  100 mg Oral QHS Clari Aguirre MD   100 mg at 04/29/21 2117    OLANZapine (ZyPREXA) tablet 5 mg  5 mg Oral Q6H PRN Glendy Agarwal, NP   5 mg at 04/28/21 1849    haloperidol lactate (HALDOL) injection 5 mg  5 mg IntraMUSCular Q6H PRN Hayley Agarwal, ASHTYN        benztropine (COGENTIN) tablet 1 mg  1 mg Oral BID PRN Hayley Agarwal, NP        diphenhydrAMINE (BENADRYL) injection 50 mg  50 mg IntraMUSCular BID PRN Hayley Agarwal, ASHTYN        hydrOXYzine HCL (ATARAX) tablet 50 mg  50 mg Oral TID PRN Glendy Agarwal, NP   50 mg at 04/30/21 0011    LORazepam (ATIVAN) injection 1 mg  1 mg IntraMUSCular Q4H PRN Glendy Agarwal NP        acetaminophen (TYLENOL) tablet 650 mg  650 mg Oral Q4H PRN Glendy Agarwal, NP   650 mg at 04/29/21 0958    magnesium hydroxide (MILK OF MAGNESIA) 400 mg/5 mL oral suspension 30 mL  30 mL Oral DAILY PRN Glendy Agarwal, NP        escitalopram oxalate (LEXAPRO) tablet 10 mg  10 mg Oral DAILY Clari Aguirre MD   10 mg at 04/30/21 0854         Mental Status Exam:  Eye contact: limited  Grooming: fair  Psychomotor activity: Decreased  Speech is spontaneous  Mood is \"okay\"  Affect: depressed. Perception: Denies any AH or VH  Suicidal ideation:  passive SI +  Cognition is grossly intact       Physical Exam:  Body habitus: Body mass index is 19.58 kg/m². Musculoskeletal system: normal gait  Tremor - neg  Cog wheeling - neg      Assessment and Plan:  Abhishek Sargent meets criteria for a diagnosis of Recurrent major depression, severe without psychotic symptoms; marijuana use disorder, moderate. Rule out borderline personality disorder. Increased Trazodone to 150mg at bedtime. Continue the medication regimen as prescribed  Disposition planning to continue. A coordinated, multidisplinary treatment team round was conducted with the patient, nurses, pharmcist,  and writer present. Discussions held with , and/or with family members; Complete current electronic health record for patient was reviewed in full including consultant notes, ancillary staff notes, nurses and tech notes, labs and vitals. I certify that this patients inpatient psychiatric hospital services furnished since the previous certification were, and continue to be, required for treatment that could reasonably be expected to improve the patient's condition, or for diagnostic study, and that the patient continues to need, on a daily basis, active treatment furnished directly by or requiring the supervision of inpatient psychiatric facility personnel. In addition, the hospital records show that services furnished were intensive treatment services, admission or related services, or equivalent services.

## 2021-04-30 NOTE — PROGRESS NOTES
Problem: Depressed Mood (Adult/Pediatric)  Goal: *STG: Participates in treatment plan  Outcome: Progressing Towards Goal  Note: Out on unit quietly present, increase time w peers and participation. No tearfulness noted or reported. Denies SI, daily goal is to attend groups. Staff focus is on offering support and reassurance.  Brighter affect reports talking to family and feeling slightly more hopeful   Goal: *STG: Verbalizes anger, guilt, and other feelings in a constructive manor  Outcome: Progressing Towards Goal  Goal: *STG: Attends activities and groups  Outcome: Progressing Towards Goal  Goal: *STG: Demonstrates reduction in symptoms and increase in insight into coping skills/future focused  Outcome: Progressing Towards Goal  Goal: *STG: Complies with medication therapy  Outcome: Progressing Towards Goal  Goal: Interventions  Outcome: Progressing Towards Goal

## 2021-04-30 NOTE — PROGRESS NOTES
Problem: Depressed Mood (Adult/Pediatric)  Goal: *STG: Verbalizes anger, guilt, and other feelings in a constructive manor  Outcome: Progressing Towards Goal  Goal: *STG: Complies with medication therapy  Outcome: Progressing Towards Goal     Pt received awake in chair, no distress noted, pt calm and complaint, pt smiling and talking with peers, pt med and meal compliant, will continue to monitor patient q15 mins for safety rounds.

## 2021-05-01 PROCEDURE — 65220000003 HC RM SEMIPRIVATE PSYCH

## 2021-05-01 PROCEDURE — 74011250637 HC RX REV CODE- 250/637: Performed by: PSYCHIATRY & NEUROLOGY

## 2021-05-01 RX ADMIN — TRAZODONE HYDROCHLORIDE 150 MG: 100 TABLET ORAL at 21:41

## 2021-05-01 RX ADMIN — ESCITALOPRAM OXALATE 10 MG: 10 TABLET ORAL at 08:23

## 2021-05-01 NOTE — BH NOTES
UP THERAPY PROGRESS NOTE    Patient is participating in Treatment Team Group. Group time: 60 minutes    Personal goal for participation: To participate in treatment plan and discharge     Goal orientation: Personal    Group therapy participation: active     Therapeutic interventions reviewed and discussed: Group members met with their treatment team individually and discussed their treatment plan with their provider, , nurse, and pharmacist. Each patient was given the opportunity to ask questions related to their discharge and/or medications. Impression of participation: Patient participated in treatment team. Engaged in conversation and discussion. Asked questions about treatment plan and discharge coordination.      Negra Davis, Supervisee in Social Work

## 2021-05-01 NOTE — PROGRESS NOTES
Problem: Depressed Mood (Adult/Pediatric)  Goal: *STG: Participates in treatment plan  Outcome: Progressing Towards Goal  Goal: *STG: Verbalizes anger, guilt, and other feelings in a constructive manor  Outcome: Progressing Towards Goal  Goal: *STG: Complies with medication therapy  Outcome: Progressing Towards Goal    Pt received awake in dining room, no distress noted, pt calm and cooperative, pt med and meal compliant, will continue to monitor patient q15 mins for safety rounds.

## 2021-05-01 NOTE — PROGRESS NOTES
Problem: Falls - Risk of  Goal: *Absence of Falls  Description: Document Negin Villalobos Fall Risk and appropriate interventions in the flowsheet.   Outcome: Progressing Towards Goal  Note: Fall Risk Interventions:            Medication Interventions: Teach patient to arise slowly       Pt appears asleep in bed, NAD, even respirations, will continue to monitor q15min                Sleep Hours: 8hrs

## 2021-05-01 NOTE — BH NOTES
GROUP THERAPY PROGRESS NOTE    Patient is participating in Community Meeting/Discharge & Goals Group. Group time: 50 minutes    Personal goal for participation: Process feelings related to discharge and/or feelings/goals for today. Goal orientation: Personal    Group therapy participation: active    Therapeutic interventions reviewed and discussed: Group discussion was focused on discharge plans and anxiety related to this. Group members discussed what they planned to do once discharge and discharge plans. Discussion also related to support and communication issues that arise. Group members verbalized how they are feeling today, their personal goal for today, and goals for the week. Patients were given an opportunity to share any concerns and issues they were having. Patients completed safety plans. Impression of participation:  Demar Peterson actively participated in group. Patient appears to be in a less anxious mood, with a brighter affect. She completed her safety plan. Calm, pleasant and cooperative in group.      Negra Davis, Supervisee in Social Work

## 2021-05-01 NOTE — BH NOTES
Chief Complaint:   I am good     Length of Stay: 3 Days    Interval History:  Bernard Sloan is feeling better today. Says states that last night she slept the best out of all of the nights that she has been here. She reports that she is feeling good today. She has been visible in the milieu and social with peers. Pleasant and calm during the interview. At the present time the patient Gisselle Osorio remains compliant with taking medications. She denies SI, HI or AVH. She states \"yesterday and today I have felt a lot better\". Denies any adverse events from taking them and feels they have been beneficial.       Past Medical History:  Past Medical History:   Diagnosis Date    Chicken pox            Labs:  Lab Results   Component Value Date/Time    WBC 8.8 04/28/2021 12:01 AM    HGB 13.6 04/28/2021 12:01 AM    HCT 40.5 04/28/2021 12:01 AM    PLATELET 922 10/67/5126 12:01 AM    MCV 88.0 04/28/2021 12:01 AM      Lab Results   Component Value Date/Time    Sodium 138 04/28/2021 12:01 AM    Potassium 3.2 (L) 04/28/2021 12:01 AM    Chloride 105 04/28/2021 12:01 AM    CO2 26 04/28/2021 12:01 AM    Anion gap 7 04/28/2021 12:01 AM    Glucose 96 04/28/2021 12:01 AM    BUN 10 04/28/2021 12:01 AM    Creatinine 0.64 04/28/2021 12:01 AM    BUN/Creatinine ratio 16 04/28/2021 12:01 AM    GFR est AA >60 04/28/2021 12:01 AM    GFR est non-AA >60 04/28/2021 12:01 AM    Calcium 9.9 04/28/2021 12:01 AM    Bilirubin, total 0.6 04/28/2021 12:01 AM    Alk.  phosphatase 48 04/28/2021 12:01 AM    Protein, total 7.9 04/28/2021 12:01 AM    Albumin 4.3 04/28/2021 12:01 AM    Globulin 3.6 04/28/2021 12:01 AM    A-G Ratio 1.2 04/28/2021 12:01 AM    ALT (SGPT) 18 04/28/2021 12:01 AM      Vitals:    04/30/21 1800 04/30/21 2108 05/01/21 0803 05/01/21 1147   BP: 116/81 129/85 105/70 119/79   Pulse: 69 66 75 86   Resp: 16 16 16 16   Temp: 98.1 °F (36.7 °C) 97.7 °F (36.5 °C) 98 °F (36.7 °C) 98 °F (36.7 °C)   SpO2: 96% 95% 96% 96%   Weight:       Height: Current Facility-Administered Medications   Medication Dose Route Frequency Provider Last Rate Last Admin    traZODone (DESYREL) tablet 150 mg  150 mg Oral QHS Fatoumata Fontenot MD   150 mg at 04/30/21 2120    OLANZapine (ZyPREXA) tablet 5 mg  5 mg Oral Q6H PRN Glendy Agarwal, NP   5 mg at 04/28/21 1849    haloperidol lactate (HALDOL) injection 5 mg  5 mg IntraMUSCular Q6H PRN Nicholas Agarwal, NP        benztropine (COGENTIN) tablet 1 mg  1 mg Oral BID PRN Nicholas Agarwal, ASHTYN        diphenhydrAMINE (BENADRYL) injection 50 mg  50 mg IntraMUSCular BID PRN Nicholas Agarwal, NP        hydrOXYzine HCL (ATARAX) tablet 50 mg  50 mg Oral TID PRN Glendy Agarwal, NP   50 mg at 04/30/21 0011    LORazepam (ATIVAN) injection 1 mg  1 mg IntraMUSCular Q4H PRN Glendy Agarwal, NP        acetaminophen (TYLENOL) tablet 650 mg  650 mg Oral Q4H PRN Glendy Agarwal, NP   650 mg at 04/29/21 0958    magnesium hydroxide (MILK OF MAGNESIA) 400 mg/5 mL oral suspension 30 mL  30 mL Oral DAILY PRN Glendy Agarwal, NP        escitalopram oxalate (LEXAPRO) tablet 10 mg  10 mg Oral DAILY Fatoumata Fontenot MD   10 mg at 05/01/21 5777         Mental Status Exam:  Eye contact: limited  Grooming: fair  Psychomotor activity: Decreased  Speech is spontaneous  Mood is \"better\"  Affect: mood congruent  Perception: Denies any AH or VH  Suicidal ideation:  Denies  Cognition is grossly intact       Physical Exam:  Body habitus: Body mass index is 19.58 kg/m². Musculoskeletal system: normal gait  Tremor - neg  Cog wheeling - neg      Assessment and Plan:  Catarino De Dios meets criteria for a diagnosis of Recurrent major depression, severe without psychotic symptoms; marijuana use disorder, moderate. Rule out borderline personality disorder. Continue the medication regimen as prescribed  Disposition planning to continue.    A coordinated, multidisplinary treatment team round was conducted with the patient, nurses, pharmcist,  and writer present. Discussions held with , and/or with family members; Complete current electronic health record for patient was reviewed in full including consultant notes, ancillary staff notes, nurses and tech notes, labs and vitals. I certify that this patients inpatient psychiatric hospital services furnished since the previous certification were, and continue to be, required for treatment that could reasonably be expected to improve the patient's condition, or for diagnostic study, and that the patient continues to need, on a daily basis, active treatment furnished directly by or requiring the supervision of inpatient psychiatric facility personnel. In addition, the hospital records show that services furnished were intensive treatment services, admission or related services, or equivalent services.

## 2021-05-01 NOTE — PROGRESS NOTES
Problem: Depressed Mood (Adult/Pediatric)  Goal: *STG: Participates in treatment plan  Outcome: Progressing Towards Goal  Note: Pt out on the unit and interacting with peers and staff. Denies any thoughts of self harm. Stated she is feeling \"a lot better\". Mood is brighter and less anxious during the shift. Encouraged pt to attend groups and participate. Goal: Interventions  Outcome: Progressing Towards Goal     Problem: Patient Education: Go to Patient Education Activity  Goal: Patient/Family Education  Outcome: Progressing Towards Goal     Problem: Falls - Risk of  Goal: *Absence of Falls  Description: Document [facility] Fall Risk and appropriate interventions in the flowsheet.   Outcome: Progressing Towards Goal  Note: Fall Risk Interventions:       Medication Interventions: Teach patient to arise slowly    Problem: Patient Education: Go to Patient Education Activity  Goal: Patient/Family Education  Outcome: Progressing Towards Goal

## 2021-05-01 NOTE — SUICIDE SAFETY PLAN
SAFETY PLAN    A suicide Safety Plan is a document that supports someone when they are having thoughts of suicide. Warning Signs that indicate a suicidal crisis may be developing: What (situations, thoughts, feelings, body sensations, behaviors, etc.) do you experience that lets you know you are beginning to think about suicide? 1. When I start to feel short tempered/irritability inpatient   2. Not being able to focus on anything but the negative   3. Not wanting to go anywhere, do anything, see/talk to anyone     Internal Coping Strategies:  What things can I do (relaxation techniques, hobbies, physical activities, etc.) to take my mind off my problems without contacting another person? 1. Drive and listen to music   2. Play guitar   3. Play xbox     People and social settings that provide distraction: Who can I call or where can I go to distract me? 1. Name: Stephen Ann friend Phone: In my phone  2. Place: The park         3. Place: The beach     People whom I can ask for help: Who can I call when I need help - for example, friends, family, clergy, someone else? 1. Name: Jimena Britton        Phone: In my phone   2. Name: Dilia Membreno  Phone: [phone]  3. Name: Huey  Phone: In my phone     Professionals or [address] [place] [address] I can contact during a crisis: Who can I call for help - for example, my doctor, my psychiatrist, my psychologist, a mental health provider, a suicide hotline? 1. Clinician Name: [employer] Phone: [phone]      Clinician Pager or Emergency Contact #: [phone]    5. Suicide Prevention Lifeline: 2-188-211-TALK (3734)    4. [address] [place] [address] Emergency Services -  for example, [facility] suicide hotline, [facility] ERline: 299.292.1453      Emergency Services Address: Kimberly [address] [place] [address], [address] [facility]      Emergency Services Phone: [phone]    Making the environment safe:  How can I make my environment (house/apartment/living space) safer? For example, can I remove guns, medications, and other items? 1. Throw away razor blades.

## 2021-05-02 PROCEDURE — 74011250637 HC RX REV CODE- 250/637: Performed by: PSYCHIATRY & NEUROLOGY

## 2021-05-02 PROCEDURE — 65220000003 HC RM SEMIPRIVATE PSYCH

## 2021-05-02 RX ADMIN — TRAZODONE HYDROCHLORIDE 150 MG: 100 TABLET ORAL at 21:21

## 2021-05-02 RX ADMIN — ESCITALOPRAM OXALATE 10 MG: 10 TABLET ORAL at 08:27

## 2021-05-02 NOTE — PROGRESS NOTES
Problem: Depressed Mood (Adult/Pediatric)  Goal: *STG: Participates in treatment plan  Outcome: Progressing Towards Goal  Note: Pt participated in treatment team. Out on the unit and interacting with peers and staff. Stated she was able to sleep last night. Feeling anxious due to possible discharge Monday. No thoughts of self harm. Feeling less depressed. Attending groups and participating. Goal: Interventions  Outcome: Progressing Towards Goal     Problem: Patient Education: Go to Patient Education Activity  Goal: Patient/Family Education  Outcome: Progressing Towards Goal     Problem: Falls - Risk of  Goal: *Absence of Falls  Description: Document [facility] Fall Risk and appropriate interventions in the flowsheet.   Outcome: Progressing Towards Goal  Note: Fall Risk Interventions:       Medication Interventions: Teach patient to arise slowly    Problem: Patient Education: Go to Patient Education Activity  Goal: Patient/Family Education  Outcome: Progressing Towards Goal

## 2021-05-02 NOTE — BH NOTES
GROUP THERAPY PROGRESS NOTE    Patient is participating in Treatment Team Group. Group time: 60 minutes    Personal goal for participation: To participate in treatment plan and discharge     Goal orientation: Personal    Group therapy participation: active     Therapeutic interventions reviewed and discussed: Group members met with their treatment team individually and discussed their treatment plan with their provider, , nurse, and pharmacist. Each patient was given the opportunity to ask questions related to their discharge and/or medications. Impression of participation: Patient participated in treatment team. Engaged in conversation and discussion. Asked questions about treatment plan and discharge.     Negra Davis, Supervisee in Social Work

## 2021-05-02 NOTE — PROGRESS NOTES
Problem: Falls - Risk of  Goal: *Absence of Falls  Description: Document [facility] [place] Fall Risk and appropriate interventions in the flowsheet.   Outcome: Progressing Towards Goal  Note: Fall Risk Interventions:            Medication Interventions: Teach patient to arise slowly         Pt appears asleep in bed, NAD, even respirations, will continue to monitor q15min               Sleep Hours: 8hrs

## 2021-05-02 NOTE — BH NOTES
Chief Complaint:  I am good, a little more anxious today. Length of Stay: 4 Days    Interval History:  Anahi Andrade is feeling better today. She states that she slept well last night. Reports that she is feeling more anxious than she has been due to situation that occurred on the unit last night. Reports that another patient on the unit has an infant and she was talking about her baby and that has caused her some anxiety to think about. Reports that she is also having some anxiety about being discharged tomorrow and how she is going to respond to that change. At the present time the patient Wolfgang Goldberg remains compliant with taking medications. She denies SI, HI or AVH. Denies any adverse events from taking them and feels they have been beneficial.       Past Medical History:  Past Medical History:   Diagnosis Date    Chicken pox            Labs:  Lab Results   Component Value Date/Time    WBC 8.8 04/28/2021 12:01 AM    HGB 13.6 04/28/2021 12:01 AM    HCT 40.5 04/28/2021 12:01 AM    PLATELET 228 11/71/8382 12:01 AM    MCV 88.0 04/28/2021 12:01 AM      Lab Results   Component Value Date/Time    Sodium 138 04/28/2021 12:01 AM    Potassium 3.2 (L) 04/28/2021 12:01 AM    Chloride 105 04/28/2021 12:01 AM    CO2 26 04/28/2021 12:01 AM    Anion gap 7 04/28/2021 12:01 AM    Glucose 96 04/28/2021 12:01 AM    BUN 10 04/28/2021 12:01 AM    Creatinine 0.64 04/28/2021 12:01 AM    BUN/Creatinine ratio 16 04/28/2021 12:01 AM    GFR est AA >60 04/28/2021 12:01 AM    GFR est non-AA >60 04/28/2021 12:01 AM    Calcium 9.9 04/28/2021 12:01 AM    Bilirubin, total 0.6 04/28/2021 12:01 AM    Alk.  phosphatase 48 04/28/2021 12:01 AM    Protein, total 7.9 04/28/2021 12:01 AM    Albumin 4.3 04/28/2021 12:01 AM    Globulin 3.6 04/28/2021 12:01 AM    A-G Ratio 1.2 04/28/2021 12:01 AM    ALT (SGPT) 18 04/28/2021 12:01 AM      Vitals:    05/01/21 1147 05/01/21 1558 05/02/21 0749 05/02/21 0940   BP: 119/79 138/84 (!) 95/57    Pulse: 86 82 84 Resp: 16 16 16    Temp: 98 °F (36.7 °C) 98 °F (36.7 °C) 98 °F (36.7 °C)    SpO2: 96% 98% 96%    Weight:    55 kg (121 lb 4.8 oz)   Height:             Current Facility-Administered Medications   Medication Dose Route Frequency Provider Last Rate Last Admin    traZODone (DESYREL) tablet 150 mg  150 mg Oral QHS Janie Casillas MD   150 mg at 05/01/21 2141    OLANZapine (ZyPREXA) tablet 5 mg  5 mg Oral Q6H PRN lGendy Agarwal, NP   5 mg at 04/28/21 1849    haloperidol lactate (HALDOL) injection 5 mg  5 mg IntraMUSCular Q6H PRN Hermila Agarwal NP        benztropine (COGENTIN) tablet 1 mg  1 mg Oral BID PRN Hermila Agarwal NP        diphenhydrAMINE (BENADRYL) injection 50 mg  50 mg IntraMUSCular BID PRN Hermila Agarwal NP        hydrOXYzine HCL (ATARAX) tablet 50 mg  50 mg Oral TID PRN Glendy Agarwal, NP   50 mg at 04/30/21 0011    LORazepam (ATIVAN) injection 1 mg  1 mg IntraMUSCular Q4H PRN Glendy Agarwal NP        acetaminophen (TYLENOL) tablet 650 mg  650 mg Oral Q4H PRN Glendy Agarwal NP   650 mg at 04/29/21 0958    magnesium hydroxide (MILK OF MAGNESIA) 400 mg/5 mL oral suspension 30 mL  30 mL Oral DAILY PRN Glendy Agarwal NP        escitalopram oxalate (LEXAPRO) tablet 10 mg  10 mg Oral DAILY Janie Casillas MD   10 mg at 05/02/21 0827         Mental Status Exam:  Eye contact: limited  Grooming: fair  Psychomotor activity: Decreased  Speech is spontaneous  Mood is \"anxious\"  Affect: mood congruent  Perception: Denies any AH or VH  Suicidal ideation:  Denies  Cognition is grossly intact       Physical Exam:  Body habitus: Body mass index is 19 kg/m².   Musculoskeletal system: normal gait  Tremor - neg  Cog wheeling - neg      Assessment and Plan:  Fadi Muir meets criteria for a diagnosis of Recurrent major depression, severe without psychotic symptoms; marijuana use disorder, moderate. Rule out borderline personality disorder. Continue the medication regimen as prescribed  Disposition planning to continue. A coordinated, multidisplinary treatment team round was conducted with the patient, nurses, pharmcist,  and writer present. Discussions held with , and/or with family members; Complete current electronic health record for patient was reviewed in full including consultant notes, ancillary staff notes, nurses and tech notes, labs and vitals. I certify that this patients inpatient psychiatric hospital services furnished since the previous certification were, and continue to be, required for treatment that could reasonably be expected to improve the patient's condition, or for diagnostic study, and that the patient continues to need, on a daily basis, active treatment furnished directly by or requiring the supervision of inpatient psychiatric facility personnel. In addition, the hospital records show that services furnished were intensive treatment services, admission or related services, or equivalent services.

## 2021-05-02 NOTE — BH NOTES
GROUP THERAPY PROGRESS NOTE    Patient is participating in Activity group. Group time: 60 minutes     Personal goal for participation: To reflect on ones self      Goal orientation: Personal     Group therapy participation: active     Therapeutic interventions reviewed and discussed: Group members threw a beach ball that had several questions and comments written on it. Once the ball was thrown to a member, the question they land on had to be answered by them. Impression of participation: [place] actively participated in group. Patient smiled in group and engaged in discussion. Less anxious today. Cooperative in group. Supportive of group members.       Negra Davis, Supervisee in Social Work

## 2021-05-03 VITALS
HEART RATE: 89 BPM | DIASTOLIC BLOOD PRESSURE: 73 MMHG | RESPIRATION RATE: 16 BRPM | WEIGHT: 121.3 LBS | TEMPERATURE: 98.8 F | BODY MASS INDEX: 19.04 KG/M2 | SYSTOLIC BLOOD PRESSURE: 124 MMHG | HEIGHT: 67 IN | OXYGEN SATURATION: 97 %

## 2021-05-03 PROCEDURE — 74011250637 HC RX REV CODE- 250/637: Performed by: PSYCHIATRY & NEUROLOGY

## 2021-05-03 RX ORDER — TRAZODONE HYDROCHLORIDE 150 MG/1
150 TABLET ORAL
Qty: 30 TAB | Refills: 0 | Status: SHIPPED | OUTPATIENT
Start: 2021-05-03

## 2021-05-03 RX ORDER — ESCITALOPRAM OXALATE 10 MG/1
10 TABLET ORAL DAILY
Qty: 30 TAB | Refills: 0 | Status: SHIPPED | OUTPATIENT
Start: 2021-05-04

## 2021-05-03 RX ORDER — HYDROXYZINE 50 MG/1
50 TABLET, FILM COATED ORAL
Qty: 30 TAB | Refills: 0 | Status: SHIPPED | OUTPATIENT
Start: 2021-05-03 | End: 2021-05-13

## 2021-05-03 RX ADMIN — ESCITALOPRAM OXALATE 10 MG: 10 TABLET ORAL at 09:24

## 2021-05-03 NOTE — PROGRESS NOTES
Pharmacist Discharge Medication Reconciliation    Discharging Provider: Dr. Gary Melendez PMH:   Past Medical History:   Diagnosis Date    Chicken pox      Chief Complaint for this Admission:   Chief Complaint   Patient presents with    Suicidal     Allergies: Patient has no known allergies. Discharge Medications:   Current Discharge Medication List        START taking these medications    Details   escitalopram oxalate (LEXAPRO) 10 mg tablet Take 1 Tab by mouth daily. Indications: major depressive disorder  Qty: 30 Tab, Refills: 0      traZODone (DESYREL) 150 mg tablet Take 1 Tab by mouth nightly. Indications: insomnia associated with depression  Qty: 30 Tab, Refills: 0      hydrOXYzine HCL (ATARAX) 50 mg tablet Take 1 Tab by mouth three (3) times daily as needed for Anxiety for up to 10 days.  Indications: anxious  Qty: 30 Tab, Refills: 0           STOP taking these medications       acetaminophen 500 mg tab 500 mg, diphenhydrAMINE 25 mg cap 25 mg Comments:   Reason for Stopping:             The patient's chart, MAR and AVS were reviewed by ANJU SaraviaD.

## 2021-05-03 NOTE — BH NOTES
Behavioral Health Transition Record to Provider    Patient Name: Meryl Rodriguez  YOB: 1995  Medical Record Number: [id number]  Date of Admission: 4/27/2021  Date of Discharge: 5/3/2021     Attending Provider: No att. providers found  Discharging Provider: Dr. Tonio Hanson  To contact this individual call [phone] and ask the  to page. If unavailable, ask to be transferred to [address] [place] [address] Provider on call. Alessandro Gregg Provider will be available on call 24/7 and during holidays. Primary Care Provider: None    No Known Allergies    Reason for Admission: CHIEF COMPLAINT:  \"I think I have been depressed all my life. \"     HISTORY OF PRESENT ILLNESS:  The patient is a 58-year-old  female who is currently admitted after she was brought by her mother to the ER with a report of worsening depression. She reports that she broke up with her fiance of 1-1/2 years recently and has felt worse ever since then. States that they have been together for a year and a half until she got pregnant and has now decided to stay with the father of her child and maintain that relationship. States that she has felt betrayed and is depressed because of this. Notes that she is sleeping poorly, has little energy or motivation, cries multiple times in a day, has started losing weight although she could not estimate how much weight she has lost.  According to the family, she has lost at least 5 pounds in the past month. States that she thinks about suicide by driving her car into oncoming traffic or by taking an overdose or hanging herself. Denies any psychotic symptoms at the present time. She denies use of alcohol but says that she has been using marijuana since the age of 24 years. States that she vapes a small amount of it almost every day and does not consider this to be an issue. Urine drug screen was positive for marijuana only.   Since her arrival on the unit, she has been calm and cooperative and has not been a behavioral disturbance except for crying very often and she cried several times during the interview also. Admission Diagnosis: MDD (major depressive disorder) [F32.9]    * No surgery found *    Results for orders placed or performed during the hospital encounter of 04/27/21   CBC WITH AUTOMATED DIFF   Result Value Ref Range    WBC 8.8 3.6 - 11.0 K/uL    RBC 4.60 3.80 - 5.20 M/uL    HGB 13.6 11.5 - 16.0 g/dL    HCT 40.5 35.0 - 47.0 %    MCV 88.0 80.0 - 99.0 FL    MCH 29.6 26.0 - 34.0 PG    MCHC 33.6 30.0 - 36.5 g/dL    RDW 13.0 11.5 - 14.5 %    PLATELET 376 162 - 772 K/uL    MPV 10.1 8.9 - 12.9 FL    NRBC 0.0 0  WBC    ABSOLUTE NRBC 0.00 0.00 - 0.01 K/uL    NEUTROPHILS 61 32 - 75 %    LYMPHOCYTES 30 12 - 49 %    MONOCYTES 7 5 - 13 %    EOSINOPHILS 1 0 - 7 %    BASOPHILS 1 0 - 1 %    IMMATURE GRANULOCYTES 0 0.0 - 0.5 %    ABS. NEUTROPHILS 5.4 1.8 - 8.0 K/UL    ABS. LYMPHOCYTES 2.6 0.8 - 3.5 K/UL    ABS. MONOCYTES 0.6 0.0 - 1.0 K/UL    ABS. EOSINOPHILS 0.1 0.0 - 0.4 K/UL    ABS. BASOPHILS 0.1 0.0 - 0.1 K/UL    ABS. IMM. GRANS. 0.0 0.00 - 0.04 K/UL    DF AUTOMATED     METABOLIC PANEL, COMPREHENSIVE   Result Value Ref Range    Sodium 138 136 - 145 mmol/L    Potassium 3.2 (L) 3.5 - 5.1 mmol/L    Chloride 105 97 - 108 mmol/L    CO2 26 21 - 32 mmol/L    Anion gap 7 5 - 15 mmol/L    Glucose 96 65 - 100 mg/dL    BUN 10 6 - 20 MG/DL    Creatinine 0.64 0.55 - 1.02 MG/DL    BUN/Creatinine ratio 16 12 - 20      GFR est AA >60 >60 ml/min/1.73m2    GFR est non-AA >60 >60 ml/min/1.73m2    Calcium 9.9 8.5 - 10.1 MG/DL    Bilirubin, total 0.6 0.2 - 1.0 MG/DL    ALT (SGPT) 18 12 - 78 U/L    AST (SGOT) 14 (L) 15 - 37 U/L    Alk.  phosphatase 48 45 - 117 U/L    Protein, total 7.9 6.4 - 8.2 g/dL    Albumin 4.3 3.5 - 5.0 g/dL    Globulin 3.6 2.0 - 4.0 g/dL    A-G Ratio 1.2 1.1 - 2.2     URINALYSIS W/ RFLX MICROSCOPIC   Result Value Ref Range    Color YELLOW/STRAW      Appearance CLEAR CLEAR      Specific gravity 1.011 1.003 - 1.030      pH (UA) 6.5 5.0 - 8.0      Protein Negative NEG mg/dL    Glucose Negative NEG mg/dL    Ketone Negative NEG mg/dL    Bilirubin Negative NEG      Blood Negative NEG      Urobilinogen 0.2 0.2 - 1.0 EU/dL    Nitrites Negative NEG      Leukocyte Esterase Negative NEG     DRUG SCREEN, URINE   Result Value Ref Range    AMPHETAMINES Negative NEG      BARBITURATES Negative NEG      BENZODIAZEPINES Negative NEG      COCAINE Negative NEG      METHADONE Negative NEG      OPIATES Negative NEG      PCP(PHENCYCLIDINE) Negative NEG      THC (TH-CANNABINOL) Positive (A) NEG      Drug screen comment (NOTE)    SALICYLATE   Result Value Ref Range    Salicylate level <9.9 (L) 2.8 - 20.0 MG/DL   ACETAMINOPHEN   Result Value Ref Range    Acetaminophen level <2 (L) 10 - 30 ug/mL   COVID-19 WITH INFLUENZA A/B   Result Value Ref Range    SARS-CoV-2 Not detected NOTD      Influenza A by PCR Not detected NOTD      Influenza B by PCR Not detected NOTD     SAMPLES BEING HELD   Result Value Ref Range    SAMPLES BEING HELD 1UC     COMMENT        Add-on orders for these samples will be processed based on acceptable specimen integrity and analyte stability, which may vary by analyte.    TSH 3RD GENERATION   Result Value Ref Range    TSH 6.11 (H) 0.36 - 3.74 uIU/mL   HCG URINE, QL. - POC   Result Value Ref Range    Pregnancy test,urine (POC) Negative NEG         Immunizations administered during this encounter:   Immunization History   Administered Date(s) Administered    DTAP Vaccine 1995, 01/24/1995, 05/11/1995, 07/31/1997, 08/16/1999    HIB Vaccine 1995, 01/24/1995, 05/11/1995    Hepatitis A Vaccine 01/18/2008, 02/05/2009    Hepatitis B Vaccine 1994, 01/24/1995, 05/11/1995    IPV 1994, 01/24/1995, 05/11/1995, 08/16/1999    MMR Vaccine 08/16/1999, 09/09/2005    Meningococcal Vaccine 03/01/2007    TDAP Vaccine 03/01/2007       Screening for Metabolic Disorders for Patients on Antipsychotic Medications  (Data obtained from the EMR)    Estimated Body Mass Index  Estimated body mass index is 19 kg/m² as calculated from the following:    Height as of this encounter: 5' 7\" (1.702 m). Weight as of this encounter: 55 kg (121 lb 4.8 oz). Vital Signs/Blood Pressure  Visit Vitals  /73   Pulse 89   Temp 98.8 °F (37.1 °C)   Resp 16   Ht 5' 7\" (1.702 m)   Wt 55 kg (121 lb 4.8 oz)   SpO2 97%   BMI 19.00 kg/m²       Blood Glucose/Hemoglobin A1c  Lab Results   Component Value Date/Time    Glucose 96 04/28/2021 12:01 AM       No results found for: HBA1C, HGBE8, BQD2KOGH     Lipid Panel  No results found for: CHOL, CHOLX, CHLST, CHOLV, 155487, HDL, HDLP, LDL, LDLC, DLDLP, TGLX, TRIGL, TRIGP, CHHD, CHHDX     Discharge Diagnosis: Recurrent major depression, severe without psychotic symptoms; marijuana use disorder, moderate. Rule out borderline personality disorder. Discharge Plan: she will return home     The patient Dre Haider exhibits the ability to control behavior in a less restrictive environment. Patient's level of functioning is improving. No assaultive/destructive behavior has been observed for the past 24 hours. No suicidal/homicidal threat or behavior has been observed for the past 24 hours. There is no evidence of serious medication side effects. Patient has not been in physical or protective restraints for at least the past 24 hours. If weapons involved, how are they secured? Yes, Mother confirmed that patient's guns are secured in the basement of the home and locked on 4/28/21. Is patient aware of and in agreement with discharge plan?  Yes     Arrangements for medication:  Prescriptions filled at [facility] of discharge instructions to provider?:  Stephanie 3 ([phone])    Arrangements for transportation home:  Family to      Keep all follow up appointments as scheduled, continue to take prescribed medications per physician instructions. Mental health crisis number:  991 or your local mental health crisis line number at [phone]. Discharge Medication List and Instructions:   Discharge Medication List as of 5/3/2021  1:56 PM      START taking these medications    Details   escitalopram oxalate (LEXAPRO) 10 mg tablet Take 1 Tab by mouth daily. Indications: major depressive disorder, Normal, Disp-30 Tab, R-0      traZODone (DESYREL) 150 mg tablet Take 1 Tab by mouth nightly. Indications: insomnia associated with depression, Normal, Disp-30 Tab, R-0      hydrOXYzine HCL (ATARAX) 50 mg tablet Take 1 Tab by mouth three (3) times daily as needed for Anxiety for up to 10 days. Indications: anxious, Normal, Disp-30 Tab, R-0         STOP taking these medications       acetaminophen 500 mg tab 500 mg, diphenhydrAMINE 25 mg cap 25 mg Comments:   Reason for Stopping:               Unresulted Labs (24h ago, onward)    None        To obtain results of studies pending at discharge, please contact [phone]    Follow-up Information     Follow up With Specialties Details Why [address]    Call [phone] and complete an intake assessment to be linked with mental health services, case managment and psychiatric medication management. [address] [place], [address]  [phone]          Advanced Directive:   Does the patient have an appointed surrogate decision maker? No  Does the patient have a Medical Advance Directive? No  Does the patient have a Psychiatric Advance Directive? No  If the patient does not have a surrogate or Medical Advance Directive AND Psychiatric Advance Directive, the patient was offered information on these advance directives Patient declined to complete    Patient Instructions: Please continue all medications until otherwise directed by physician. Tobacco Cessation Discharge Plan:   Is the patient a smoker and needs referral for smoking cessation? Yes  Patient referred to the following for smoking cessation with an appointment? Refused     Patient was offered medication to assist with smoking cessation at discharge? Refused  Was education for smoking cessation added to the discharge instructions? Yes    Alcohol/Substance Abuse Discharge Plan:   Does the patient have a history of substance/alcohol abuse and requires a referral for treatment? Yes  Patient referred to the following for substance/alcohol abuse treatment with an appointment? Yes- [address]   Patient was offered medication to assist with alcohol cessation at discharge? No  Was education for substance/alcohol abuse added to discharge instructions? No    Patient discharged to Home; discussed with patient/caregiver and provided to the patient/caregiver either in hard copy or electronically.

## 2021-05-03 NOTE — BH NOTES
GROUP THERAPY PROGRESS NOTE    Patient is participating in Group Psychotherapy. Group time: 50 minutes    Personal goal for participation: Cognitive restructuring     Goal orientation: Personal    Group therapy participation: active    Therapeutic interventions reviewed and discussed: Group members were provided psychoeducation on cognitive distortions and the importance of increasing awareness of thoughts. Patients were handed a thought log/thought record to record their experiences, thoughts, feelings, and behaviors that are associated with them. The goal is for members to become aware of cognitive distortions that are unnoticed and challenge them. Group members then discussed different techniques to work on cognitive restructuring: Socratic questioning, decatastrophizing, putting thoughts on trial.     Impression of participation: Gabrielle Ronquillo actively participated in group. Pt processed her relationships and how it has affected her mental health. Engaged in discussion and conversation. Demonstrated understanding of cognitive restructuring and distortions. Presented with euthymic mood and is hopeful she will be discharged today.      Negra Davis, Supervisee in Social Work

## 2021-05-03 NOTE — BH NOTES
GROUP THERAPY PROGRESS NOTE     Patient participated in progressive healthy living and wellness education group. Group time: 50 min     Personal goal for participation: Practice positive affirmation, journaling and letter writing techniques to process thoughts and emotions related to ones sense of self and begin to improve self-talk and self-image in efforts to improve mental and emotional wellbeing. Goal orientation: Personal.      Group therapy participation: passive      Therapeutic interventions reviewed and discussed: Group members were given the opportunity to write letters to themselves (forgive past self, thanking present self or congratulating future self) and share them with the group. Members then read their letters aloud and processed the experience with each other. Impression of participation: Kerry Mohr was alert, oriented and calm. She sat quietly in group listening to others but did not engage in conversation.      SABRINA Page, Supervisee in Social Work

## 2021-05-03 NOTE — DISCHARGE INSTRUCTIONS
DISCHARGE SUMMARY    NAME:Tonie Wells  : 1995  MRN: [id number]    The patient Deette Place exhibits the ability to control behavior in a less restrictive environment. Patient's level of functioning is improving. No assaultive/destructive behavior has been observed for the past 24 hours. No suicidal/homicidal threat or behavior has been observed for the past 24 hours. There is no evidence of serious medication side effects. Patient has not been in physical or protective restraints for at least the past 24 hours. If weapons involved, how are they secured? Yes, Mother confirmed that patient's guns are secured in the basement of the home and locked on 21. Is patient aware of and in agreement with discharge plan? Yes     Arrangements for medication:  Prescriptions filled at [facility] of discharge instructions to provider?:  Stephanie 2 ([phone])    Arrangements for transportation home:  Family to      Keep all follow up appointments as scheduled, continue to take prescribed medications per physician instructions. Mental health crisis number:  038 or your local mental health crisis line number at [phone]. Mental Health Emergency WARM LINE      [phone] ([phone])      M-F: 9am to 9pm      Sat & Sun: 5pm - 9pm  National suicide prevention lines:                             6-053-FRBOWXV ([phone])       [phone] ([phone])    Crisis Text Line:  Text HOME to ORLANDO Ayala 9 from Nurse    PATIENT INSTRUCTIONS:      What to do at Home:  Recommended activity: Activity as tolerated,         *  Please give a list of your current medications to your Primary Care Provider. *  Please update this list whenever your medications are discontinued, doses are      changed, or new medications (including over-the-counter products) are added. *  Please carry medication information at all times in case of emergency situations.     These are general instructions for a healthy lifestyle:    No smoking/ No tobacco products/ Avoid exposure to second hand smoke  Surgeon General's Warning:  Quitting smoking now greatly reduces serious risk to your health. Obesity, smoking, and sedentary lifestyle greatly increases your risk for illness    A healthy diet, regular physical exercise & weight monitoring are important for maintaining a healthy lifestyle    You may be retaining fluid if you have a history of heart failure or if you experience any of the following symptoms:  Weight gain of 3 pounds or more overnight or 5 pounds in a week, increased swelling in our hands or feet or shortness of breath while lying flat in bed. Please call your doctor as soon as you notice any of these symptoms; do not wait until your next office visit. The discharge information has been reviewed with the patient. The patient verbalized understanding. Discharge medications reviewed with the patient and appropriate educational materials and side effects teaching were provided.   ___________________________________________________________________________________________________________________________________

## 2021-05-03 NOTE — DISCHARGE SUMMARY
DISCHARGE SUMMARY    Some parts of the discharge summary are from the initial Psychiatric interview that was done on admission by the admitting psychiatrist.     Date of Admission: 4/27/2021    Date of Discharge: 5/3/2021     TYPE OF DISCHARGE:   REGULAR -  YES    AMA  RELEASED BY THE [facility] COURT    CHIEF COMPLAINT:  \"I think I have been depressed all my life. \"     HISTORY OF PRESENT ILLNESS:  The patient is a 67-year-old  female who is currently admitted after she was brought by her mother to the ER with a report of worsening depression. She reports that she broke up with her fiance of 1-1/2 years recently and has felt worse ever since then. States that they have been together for a year and a half until she got pregnant and has now decided to stay with the father of her child and maintain that relationship. States that she has felt betrayed and is depressed because of this. Notes that she is sleeping poorly, has little energy or motivation, cries multiple times in a day, has started losing weight although she could not estimate how much weight she has lost.  According to the family, she has lost at least 5 pounds in the past month. States that she thinks about suicide by driving her car into oncoming traffic or by taking an overdose or hanging herself. Denies any psychotic symptoms at the present time. She denies use of alcohol but says that she has been using marijuana since the age of 24 years. States that she vapes a small amount of it almost every day and does not consider this to be an issue. Urine drug screen was positive for marijuana only.   Since her arrival on the unit, she has been calm and cooperative and has not been a behavioral disturbance except for crying very often and she cried several times during the interview also.     PAST MEDICAL HISTORY:  Reviewed as per the history and physical exam.             Past Medical History:   Diagnosis Date    Chicken pox                Prior to Admission medications    Medication Sig Start Date End Date Taking? Authorizing Provider   acetaminophen 500 mg tab 500 mg, diphenhydrAMINE 25 mg cap 25 mg Take 1 Dose by mouth nightly.     Yes Provider, Historical             Vitals:     04/28/21 2009 04/29/21 0908 04/29/21 1003 04/29/21 1132   BP: 116/78 120/84   125/80   Pulse: 65 90   65   Resp: 14 16   16   Temp: 98.3 °F (36.8 °C) 98.5 °F (36.9 °C)   98.2 °F (36.8 °C)   SpO2:   96%   98%   Weight:     56.7 kg (125 lb)     Height:     5' 7\" (1.702 m)              Lab Results   Component Value Date/Time     WBC 8.8 04/28/2021 12:01 AM     HGB 13.6 04/28/2021 12:01 AM     HCT 40.5 04/28/2021 12:01 AM     PLATELET 421 90/06/3443 12:01 AM     MCV 88.0 04/28/2021 12:01 AM            Lab Results   Component Value Date/Time     Sodium 138 04/28/2021 12:01 AM     Potassium 3.2 (L) 04/28/2021 12:01 AM     Chloride 105 04/28/2021 12:01 AM     CO2 26 04/28/2021 12:01 AM     Anion gap 7 04/28/2021 12:01 AM     Glucose 96 04/28/2021 12:01 AM     BUN 10 04/28/2021 12:01 AM     Creatinine 0.64 04/28/2021 12:01 AM     BUN/Creatinine ratio 16 04/28/2021 12:01 AM     GFR est AA >60 04/28/2021 12:01 AM     GFR est non-AA >60 04/28/2021 12:01 AM     Calcium 9.9 04/28/2021 12:01 AM     Bilirubin, total 0.6 04/28/2021 12:01 AM     Alk. phosphatase 48 04/28/2021 12:01 AM     Protein, total 7.9 04/28/2021 12:01 AM     Albumin 4.3 04/28/2021 12:01 AM     Globulin 3.6 04/28/2021 12:01 AM     A-G Ratio 1.2 04/28/2021 12:01 AM     ALT (SGPT) 18 04/28/2021 12:01 AM     AST (SGOT) 14 (L) 04/28/2021 12:01 AM      No results found for: VALF2, VALAC, VALP, VALPR, DS6, CRBAM, CRBAMP, CARB2, XCRBAM  No results found for: LITHM  RADIOLOGY REPORTS:(reviewed/updated 4/29/2021)  No results found.         Lab Results   Component Value Date/Time     Pregnancy test,urine (POC) Negative 04/28/2021 12:37 AM            PAST PSYCHIATRIC HISTORY:  The patient denies any prior history of inpatient hospitalization, but has a history of cutting her left forearm 10 days ago with a razor. Denies any prior history of other incidents of self-harm. Denies prior history of substance abuse except as mentioned in the history of present illness. States that she has had at least three prior episodes of depression some of which have lasted for several months but never sought treatment for these episodes.     PSYCHOSOCIAL HISTORY:  The patient currently lives in [place] where she lives with her mother and her stepfather. Four younger siblings also live in the same house. 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 Her family remains quite supportive, and she denies any major legal or financial stressors. Her recent troubles with her romantic relationship were described above. Denies any major legal stressors.     MENTAL STATUS EXAM:  The patient is a young  female who is dressed in hospital apparel. She cried several times during the interview and looks depressed. Makes limited eye contact. Her psychomotor activity is decreased. Speech is spontaneous and coherent but speaks very softly and can be difficult to understand. Denies any delusions. Denies any perceptual abnormalities. Passive suicidal ideation is present but feels safe in the hospital.  Thought process is logical and goal-directed. Cognitively, she is awake and alert, oriented to time, place, and person. Intelligence is average, memory is intact, and fund of knowledge is adequate. Insight is partial.  Judgment is poor.     ASSESSMENT AND PLAN/DIAGNOSES:  Recurrent major depression, severe without psychotic symptoms; marijuana use disorder, moderate. Rule out borderline personality disorder.     At the present time, I will continue her inpatient stay. She will be provided with support and attend groups.   Psychotropic medications will be adjusted as needed. Her strengths include her ability to seek help and support from her family. COURSE IN THE HOSPITAL:    Patient was admitted to the inpatient psychiatry unit for acute psychiatric stabilization in regards to symptomatology as described in the HPI above and placed on Q15 minute checks and withdrawal precautions. While on the unit Kedar Alexander was involved in individual, group, occupational and milieu therapy. She was started back on her usual medication regimen as well as PRN medications including Lexapro, Vistaril for anxiety and Trazodone for sleep. She improved gradually and was able to integrate into the milieu with help from the nursing staff. Patients symptoms improved gradually including crying spells, poor sleep, SI, low moods, poor energy, and thoughts of self harm. She was quite on the unit, appropriate in her interactions, and cooperative with medications and the unit routine. Please see individual progress notes for more specific details regarding patient's hospitalization course. Patient was discharged as per the plan. She had been doing well on the unit as per the report of the nursing staff and my observations. No PRN medication for agitation, seclusion or restraints were required during the last 48 hours of her stay. Kedar Alexander had improved progressively to the point of being stable for discharge and outpatient FU. At this time she did not offer any complaints. Patient denied any SI or HI. Denied any AH or VH. She denied any delusions. Was not considered a danger to self or to others and is safe for discharge. Will FU with her appointments and remains motivated to be in treatment. The patient verbalized understanding of her discharge instructions. DISCHARGE DIAGNOSIS:  Recurrent major depression, severe without psychotic symptoms; marijuana use disorder, moderate. Rule out borderline personality disorder.     MENTAL STATUS EXAM ON DISCHARGE:    General appearance:   Kedar Alexander is a 31 y.o. WHITE female who is well groomed, psychomotor activity is WNL  Eye contact: makes good eye contact  Speech: Spontaneous and coherent  Affect : Euthymic  Mood: \"OK\"  Thought Process: Logical, goal directed  Perception: Denies any AH or VH. Thought Content: Denies any SI or Plan  Insight: Partial  Judgement: Fair  Cognition: Intact grossly. Current Discharge Medication List      START taking these medications    Details   escitalopram oxalate (LEXAPRO) 10 mg tablet Take 1 Tab by mouth daily. Indications: major depressive disorder  Qty: 30 Tab, Refills: 0      traZODone (DESYREL) 150 mg tablet Take 1 Tab by mouth nightly. Indications: insomnia associated with depression  Qty: 30 Tab, Refills: 0      hydrOXYzine HCL (ATARAX) 50 mg tablet Take 1 Tab by mouth three (3) times daily as needed for Anxiety for up to 10 days. Indications: anxious  Qty: 30 Tab, Refills: 0         STOP taking these medications       acetaminophen 500 mg tab 500 mg, diphenhydrAMINE 25 mg cap 25 mg Comments:   Reason for Stopping:                No results found for: VALF2, VALAC, VALP, VALPR, DS6, CRBAM, CRBAMP, CARB2, XCRBAM  No results found for: LITHM    Follow-up Information     Follow up With Specialties Details Why [address]    Call [phone] and complete an intake assessment to be linked with mental health services, case managment and psychiatric medication management. [facility] [address] [place] [address], [facility]  [phone]    None    None ([phone]) Patient stated that they have no PCP          WOUND CARE: none needed. PROGNOSIS:   Good / Fair based on nature of patient's pathology/ies and treatment compliance issues. Prognosis is greatly dependent upon patient's ability to  follow up on psychiatric/psychotherapy appointments as well as to comply with psychiatric medications as prescribed.

## 2021-05-03 NOTE — INTERDISCIPLINARY ROUNDS
Behavioral Health Interdisciplinary Rounds     Patient Name: Raven Greene  Age: 31 y.o. Room/Bed:  727/  Primary Diagnosis: <principal problem not specified>   Admission Status: Voluntary     Readmission within 30 days: no  Power of  in place: no  Patient requires a blocked bed: no          Reason for blocked bed:     VTE Prophylaxis: No    Mobility needs/Fall risk: no  Flu Vaccine :    Nutritional Plan: no  Consults:          Labs/Testing due today?: no    Sleep hours 7      Participation in Care/Groups:  yes  Medication Compliant?: Yes  PRNS (last 24 hours): None    Restraints (last 24 hours):  no     CIWA (range last 24 hours):     COWS (range last 24 hours):      Alcohol screening (AUDIT) completed -   AUDIT Score: 0     If applicable, date SBIRT discussed in treatment team AND documented:   AUDIT Screen Score: AUDIT Score: 0    Tobacco - patient is a smoker: Have You Used Tobacco in the Past 30 Days: Yes(nicotine vape daily pt denies however states in ED w [facility])  Illegal Drugs use: Have You Used Any Illegal Substances Over the Past 12 Months: Yes    24 hour chart check complete: yes     Patient goal(s) for today:   Treatment team focus/goals: plan for discharge today   Progress note : Plan for discharge today. LOS:  5  Expected LOS: today     Financial concerns/prescription coverage:  No insurance   Family contact:    Family requesting physician contact today:   Discharge plan: she will return home with family   Access to weapons :  no    Outpatient provider(s): refer to [facility]   Patient's preferred phone number for follow up call :  Patient's preferred e-mail address :  Participating treatment team members: Haily Meza Rn - [place] Cristina, PharmD.

## 2021-05-03 NOTE — BH NOTES
GROUP THERAPY PROGRESS NOTE    Patient is participating in Goals Setting/Community Meeting Group. Group time: 50 minutes    Personal goal for participation: Set goals for treatment and mental health recovery as a strategy to increase motivation around action steps to achieving goals and start to identify some objectives for future individual or group therapy in the outpatient setting. Goal orientation: Personal    Group therapy participation:  minimal     Therapeutic interventions reviewed and discussed: Group members were engaged in conversation about treatment team, effective communication and goal setting. Group members were given the opportunity to reflect on the most important areas of their lives (family, friends, work/school, spirituality, physical health, and mental health), what they are happy with and feel is going well, as well as what they would like to improve on and how that would change their life. They were encouraged to set goals (for the day, the week and their discharge/treatment plan) and discuss the actions steps necessary to achieve their goals. Impression of participation: Savanah Oliveira was alert, oriented, calm and quiet. She did not engaged in group conversation. She shared her goal was to work with a therapist for the first time after discharged and she is excited about this opportunity. She was quiet and disengaged when peers were discussing issues with staff. She shared that the \"new born\" chimes int [facility] were upsetting to her and became tearful during group when hearing them.        SABRINA Duffy, Supervisee in Social Work

## 2021-05-03 NOTE — BH NOTES
GROUP THERAPY PROGRESS NOTE    Patient is participating in Leisure Education Group     Group time: 50 minutes    Personal goal for participation: reduce symptoms of anxiety and depression     Goal orientation: Personal    Group therapy participation: active    Therapeutic interventions reviewed and discussed: Group members were handed worksheets that discussed strategies for reducing anxiety and depression. Some of these include deep breathing, progressive muscle relaxation, imagery, and challenging irrational thoughts. These skills were taught in session and each member was encouraged to try each activity in group and process how it can be applied outside of group. Impression of participation: Adelaida Chuck actively participated in group. Pt participated in the exercises and completed them. Cooperative. Pt is smiling and ready to be discharged. Demonstrated understanding of coping skills.      Negra Davis, Supervisee in Social Work

## 2021-05-03 NOTE — PROGRESS NOTES
Problem: Falls - Risk of  Goal: *Absence of Falls  Description: Document Dayo Myers Fall Risk and appropriate interventions in the flowsheet. Outcome: Progressing Towards Goal  Note: Fall Risk Interventions:            Medication Interventions: Teach patient to arise slowly    Patient received, appears to be asleep, eyes closed, breathing even and unlabored. No signs of distress noted. Will continue to monitor for safety.

## 2021-05-03 NOTE — PROGRESS NOTES
Problem: Depressed Mood (Adult/Pediatric)  Goal: *STG: Participates in treatment plan  Outcome: Progressing Towards Goal  Note: Out on unit bright affect smile positive and hopeful. Denies SI, demonstrates appropriate behaviors, ability to get her needs met, follow direction. Daily goal is to d/c home.  Staff focus is on offering support and d/c education  Goal: *STG: Verbalizes anger, guilt, and other feelings in a constructive manor  Outcome: Progressing Towards Goal  Goal: *STG: Attends activities and groups  Outcome: Progressing Towards Goal  Goal: *STG: Demonstrates reduction in symptoms and increase in insight into coping skills/future focused  Outcome: Progressing Towards Goal  Goal: *STG: Complies with medication therapy  Outcome: Progressing Towards Goal  Goal: Interventions  Outcome: Progressing Towards Goal

## 2021-05-07 NOTE — PROGRESS NOTES
Reached out to patient at [phone] for follow up. Patient reported she has appointment next week on Tuesday with Jesse MCCLAIN. No other resources needed at this time.

## 2022-03-20 PROBLEM — F32.9 MDD (MAJOR DEPRESSIVE DISORDER): Status: ACTIVE | Noted: 2021-04-28

## 2023-05-10 RX ORDER — TRAZODONE HYDROCHLORIDE 150 MG/1
TABLET ORAL
COMMUNITY
Start: 2021-05-03

## 2023-05-10 RX ORDER — ESCITALOPRAM OXALATE 10 MG/1
TABLET ORAL DAILY
COMMUNITY
Start: 2021-05-04